# Patient Record
Sex: MALE | Race: WHITE | NOT HISPANIC OR LATINO | Employment: UNEMPLOYED | URBAN - METROPOLITAN AREA
[De-identification: names, ages, dates, MRNs, and addresses within clinical notes are randomized per-mention and may not be internally consistent; named-entity substitution may affect disease eponyms.]

---

## 2021-05-26 ENCOUNTER — RECORDS - HEALTHEAST (OUTPATIENT)
Dept: ADMINISTRATIVE | Facility: CLINIC | Age: 41
End: 2021-05-26

## 2022-09-09 ENCOUNTER — APPOINTMENT (OUTPATIENT)
Dept: CT IMAGING | Facility: HOSPITAL | Age: 42
End: 2022-09-09
Attending: EMERGENCY MEDICINE
Payer: COMMERCIAL

## 2022-09-09 ENCOUNTER — HOSPITAL ENCOUNTER (EMERGENCY)
Facility: HOSPITAL | Age: 42
Discharge: HOME | End: 2022-09-10
Attending: EMERGENCY MEDICINE | Admitting: EMERGENCY MEDICINE
Payer: COMMERCIAL

## 2022-09-09 DIAGNOSIS — K52.9 GASTROENTERITIS: ICD-10-CM

## 2022-09-09 LAB
ALBUMIN SERPL-MCNC: 3.8 G/DL (ref 3.5–5)
ALP SERPL-CCNC: 75 U/L (ref 45–120)
ALT SERPL W P-5'-P-CCNC: 11 U/L (ref 0–45)
ANION GAP SERPL CALCULATED.3IONS-SCNC: 10 MMOL/L (ref 5–18)
AST SERPL W P-5'-P-CCNC: 14 U/L (ref 0–40)
BASOPHILS # BLD AUTO: 0 10E3/UL (ref 0–0.2)
BASOPHILS NFR BLD AUTO: 0 %
BILIRUB SERPL-MCNC: 0.5 MG/DL (ref 0–1)
BUN SERPL-MCNC: 16 MG/DL (ref 8–22)
CALCIUM SERPL-MCNC: 9.4 MG/DL (ref 8.5–10.5)
CHLORIDE BLD-SCNC: 99 MMOL/L (ref 98–107)
CO2 SERPL-SCNC: 29 MMOL/L (ref 22–31)
CREAT SERPL-MCNC: 1.04 MG/DL (ref 0.7–1.3)
EOSINOPHIL # BLD AUTO: 0.2 10E3/UL (ref 0–0.7)
EOSINOPHIL NFR BLD AUTO: 2 %
ERYTHROCYTE [DISTWIDTH] IN BLOOD BY AUTOMATED COUNT: 13.1 % (ref 10–15)
GFR SERPL CREATININE-BSD FRML MDRD: >90 ML/MIN/1.73M2
GLUCOSE BLD-MCNC: 95 MG/DL (ref 70–125)
HCT VFR BLD AUTO: 51.9 % (ref 40–53)
HGB BLD-MCNC: 17.3 G/DL (ref 13.3–17.7)
IMM GRANULOCYTES # BLD: 0 10E3/UL
IMM GRANULOCYTES NFR BLD: 0 %
LIPASE SERPL-CCNC: 32 U/L (ref 0–52)
LYMPHOCYTES # BLD AUTO: 1.9 10E3/UL (ref 0.8–5.3)
LYMPHOCYTES NFR BLD AUTO: 25 %
MAGNESIUM SERPL-MCNC: 1.7 MG/DL (ref 1.8–2.6)
MCH RBC QN AUTO: 29.4 PG (ref 26.5–33)
MCHC RBC AUTO-ENTMCNC: 33.3 G/DL (ref 31.5–36.5)
MCV RBC AUTO: 88 FL (ref 78–100)
MONOCYTES # BLD AUTO: 1.6 10E3/UL (ref 0–1.3)
MONOCYTES NFR BLD AUTO: 20 %
NEUTROPHILS # BLD AUTO: 4.2 10E3/UL (ref 1.6–8.3)
NEUTROPHILS NFR BLD AUTO: 53 %
NRBC # BLD AUTO: 0 10E3/UL
NRBC BLD AUTO-RTO: 0 /100
PLATELET # BLD AUTO: 308 10E3/UL (ref 150–450)
POTASSIUM BLD-SCNC: 4.7 MMOL/L (ref 3.5–5)
PROT SERPL-MCNC: 6.9 G/DL (ref 6–8)
RBC # BLD AUTO: 5.89 10E6/UL (ref 4.4–5.9)
SODIUM SERPL-SCNC: 138 MMOL/L (ref 136–145)
WBC # BLD AUTO: 7.8 10E3/UL (ref 4–11)

## 2022-09-09 PROCEDURE — 96374 THER/PROPH/DIAG INJ IV PUSH: CPT | Mod: 59

## 2022-09-09 PROCEDURE — 250N000011 HC RX IP 250 OP 636: Performed by: EMERGENCY MEDICINE

## 2022-09-09 PROCEDURE — 36415 COLL VENOUS BLD VENIPUNCTURE: CPT | Performed by: EMERGENCY MEDICINE

## 2022-09-09 PROCEDURE — 258N000003 HC RX IP 258 OP 636: Performed by: EMERGENCY MEDICINE

## 2022-09-09 PROCEDURE — 80053 COMPREHEN METABOLIC PANEL: CPT | Performed by: EMERGENCY MEDICINE

## 2022-09-09 PROCEDURE — 96361 HYDRATE IV INFUSION ADD-ON: CPT

## 2022-09-09 PROCEDURE — 83690 ASSAY OF LIPASE: CPT | Performed by: EMERGENCY MEDICINE

## 2022-09-09 PROCEDURE — 85014 HEMATOCRIT: CPT | Performed by: EMERGENCY MEDICINE

## 2022-09-09 PROCEDURE — 99285 EMERGENCY DEPT VISIT HI MDM: CPT | Mod: 25

## 2022-09-09 PROCEDURE — 74177 CT ABD & PELVIS W/CONTRAST: CPT

## 2022-09-09 PROCEDURE — 83735 ASSAY OF MAGNESIUM: CPT | Performed by: EMERGENCY MEDICINE

## 2022-09-09 RX ORDER — KETOROLAC TROMETHAMINE 15 MG/ML
15 INJECTION, SOLUTION INTRAMUSCULAR; INTRAVENOUS ONCE
Status: COMPLETED | OUTPATIENT
Start: 2022-09-09 | End: 2022-09-10

## 2022-09-09 RX ORDER — IOPAMIDOL 755 MG/ML
100 INJECTION, SOLUTION INTRAVASCULAR ONCE
Status: COMPLETED | OUTPATIENT
Start: 2022-09-10 | End: 2022-09-09

## 2022-09-09 RX ORDER — SODIUM CHLORIDE 9 MG/ML
INJECTION, SOLUTION INTRAVENOUS CONTINUOUS
Status: DISCONTINUED | OUTPATIENT
Start: 2022-09-09 | End: 2022-09-10

## 2022-09-09 RX ORDER — ONDANSETRON 2 MG/ML
4 INJECTION INTRAMUSCULAR; INTRAVENOUS ONCE
Status: COMPLETED | OUTPATIENT
Start: 2022-09-09 | End: 2022-09-09

## 2022-09-09 RX ADMIN — SODIUM CHLORIDE 1000 ML: 9 INJECTION, SOLUTION INTRAVENOUS at 22:36

## 2022-09-09 RX ADMIN — ONDANSETRON 4 MG: 2 INJECTION INTRAMUSCULAR; INTRAVENOUS at 22:34

## 2022-09-09 RX ADMIN — SODIUM CHLORIDE 1000 ML: 9 INJECTION, SOLUTION INTRAVENOUS at 23:18

## 2022-09-09 RX ADMIN — IOPAMIDOL 100 ML: 755 INJECTION, SOLUTION INTRAVENOUS at 23:47

## 2022-09-09 ASSESSMENT — ENCOUNTER SYMPTOMS
DIARRHEA: 1
VOMITING: 1
HEMATURIA: 0
BLOOD IN STOOL: 0
DYSURIA: 0
NAUSEA: 1
ABDOMINAL PAIN: 1

## 2022-09-10 VITALS
OXYGEN SATURATION: 98 % | BODY MASS INDEX: 22.49 KG/M2 | HEIGHT: 65 IN | HEART RATE: 84 BPM | SYSTOLIC BLOOD PRESSURE: 122 MMHG | RESPIRATION RATE: 16 BRPM | TEMPERATURE: 99.7 F | WEIGHT: 135 LBS | DIASTOLIC BLOOD PRESSURE: 73 MMHG

## 2022-09-10 PROCEDURE — 250N000011 HC RX IP 250 OP 636: Performed by: EMERGENCY MEDICINE

## 2022-09-10 PROCEDURE — 96375 TX/PRO/DX INJ NEW DRUG ADDON: CPT

## 2022-09-10 RX ORDER — ONDANSETRON 4 MG/1
4 TABLET, ORALLY DISINTEGRATING ORAL EVERY 8 HOURS PRN
Qty: 20 TABLET | Refills: 0 | Status: SHIPPED | OUTPATIENT
Start: 2022-09-10 | End: 2022-09-13

## 2022-09-10 RX ADMIN — KETOROLAC TROMETHAMINE 15 MG: 15 INJECTION, SOLUTION INTRAMUSCULAR; INTRAVENOUS at 00:11

## 2022-09-10 RX ADMIN — FAMOTIDINE 20 MG: 10 INJECTION INTRAVENOUS at 00:11

## 2022-09-10 ASSESSMENT — ACTIVITIES OF DAILY LIVING (ADL): ADLS_ACUITY_SCORE: 35

## 2022-09-10 NOTE — ED PROVIDER NOTES
EMERGENCY DEPARTMENT ENCOUNTER      NAME: Ankit Man  AGE: 42 year old male  YOB: 1980  MRN: [id number]  EVALUATION DATE & TIME: No admission date for patient encounter.    PCP: System, Provider Not In    ED PROVIDER: Rebecca Bardales M.D.      Chief Complaint   Patient presents with     Abdominal Pain     Nausea, Vomiting, & Diarrhea         FINAL IMPRESSION:  1. Gastroenteritis        MEDICAL DECISION MAKIN:02 PM I met with the patient, obtained history, performed an initial exam, and discussed options and plan for diagnostics and treatment here in the ED. PPE worn: cloth mask, n95 mask, eye protection and nitrile gloves.  12:18 AM Results were communicated with the patient. Discussed discharge plans along with return precautions. Patient was agreeable with plan.        Pertinent Labs & Imaging studies reviewed. (See chart for details)    Ankit Man is a 42 year old male who presents with nausea, vomiting or abdominal pain.  He has a history of prior peptic ulcer disease, methamphetamine and marijuana use presenting for evaluation of abdominal pain, vomiting, and diarrhea. He was noted to be tachycardic at 115 initially.  Pain was in the left lower quadrant predominantly--gastroenteritis versus colitis versus enteritis.  No evidence by exam or history of GI bleeding.  He does regularly use methamphetamines.  Last marijuana use was a week ago.  Less likely to be hyperemesis syndrome from marijuana and more likely to be methamphetamine withdrawal related.  His abdominal exam is benign.  He has not had CT scans of late, so I will repeat this to evaluate for diverticulitis or other colitis.  Labs will be obtained.  He will get medications for pain, nausea and fluids.     Labs showed no acute abnormalities (CBC, Basic Metabolic Panel, liver chemistry tests, lipase all normal). CT abdomen pelvis showed no abnormalities.. He was treated with Zofran, Toradol, famotidine and IV fluids. Heart  "rate did improve with the interventions to the 70s. On reassessment, he felt improved.  He was given an oral challenge which he tolerated.. We will plan discharge with zofran, follow up with primary care doctor and GI. I advised methamphetamine treatment and provided phone numbers to find convenient treatment programs to help.  We discussed warning signs and indications to return to the emergency department.  He understands these and all discharge instructions.         MEDICATIONS GIVEN IN THE EMERGENCY:  Medications   0.9% sodium chloride BOLUS (0 mLs Intravenous Stopped 9/9/22 2318)   ondansetron (ZOFRAN) injection 4 mg (4 mg Intravenous Given 9/9/22 2234)   0.9% sodium chloride BOLUS (1,000 mLs Intravenous New Bag 9/9/22 2318)   ketorolac (TORADOL) injection 15 mg (15 mg Intravenous Given 9/10/22 0011)   iopamidol (ISOVUE-370) solution 100 mL (100 mLs Intravenous Given 9/9/22 2347)   famotidine (PEPCID) injection 20 mg (20 mg Intravenous Given 9/10/22 0011)       NEW PRESCRIPTIONS STARTED AT TODAY'S ER VISIT:  New Prescriptions    ONDANSETRON (ZOFRAN ODT) 4 MG ODT TAB    Take 1 tablet (4 mg) by mouth every 8 hours as needed for nausea or vomiting          =================================================================    HPI    Patient information was obtained from: patient     Use of : Use of : N/A       Ankit Man is a 42 year old male with a history of methamphetamine dependence, and regular marijuana use, who presents with left lower abdominal pain, nausea, vomiting, and diarrhea.    Patient here with 3 days of nausea and vomiting with associated left lower quadrant abdominal pain. Pain feels like a \"crampy\" sensation. He has had these symptoms before but usually they resolve on its own after a few episodes of vomiting but this has not stopped which prompt him to come into ED. No blood noted in vomit. He had been able to pass gas and has been having non-bloody diarrhea. He has been " "feeling \"warm\" but no actual measured fevers at home. He has not had any prior abdominal surgeries but has history of stomach ulcers. No other sick contacts at home. He does take omeprazole regularly. No other prescribed medications. He does smoke marijuana regularly but has never had associated nausea or vomiting with use. No hematuria or dysuria. No other medical complaints at this time.    REVIEW OF SYSTEMS   Review of Systems   Gastrointestinal: Positive for abdominal pain (LLQ \"crampy\"), diarrhea, nausea and vomiting (nonbloody). Negative for blood in stool.   Genitourinary: Negative.  Negative for dysuria and hematuria.   All other systems reviewed and are negative.       PAST MEDICAL HISTORY:  Past Medical History:   Diagnosis Date     Anxiety      Depressive disorder      ED (erectile dysfunction)      Substance abuse (H)        PAST SURGICAL HISTORY:  History reviewed. No pertinent surgical history.    CURRENT MEDICATIONS:      Current Facility-Administered Medications:      0.9% sodium chloride BOLUS, 1,000 mL, Intravenous, Once, Last Rate: 1,000 mL/hr at 09/09/22 2318, 1,000 mL at 09/09/22 2318 **FOLLOWED BY** sodium chloride 0.9% infusion, , Intravenous, Continuous, David Venegas MD    Current Outpatient Medications:      ondansetron (ZOFRAN ODT) 4 MG ODT tab, Take 1 tablet (4 mg) by mouth every 8 hours as needed for nausea or vomiting, Disp: 20 tablet, Rfl: 0    ALLERGIES:  No Known Allergies    FAMILY HISTORY:  History reviewed. No pertinent family history.    SOCIAL HISTORY:   Social History     Tobacco Use     Smoking status: Current Every Day Smoker     Types: Cigarettes     Smokeless tobacco: Never Used   Substance Use Topics     Alcohol use: Yes     Drug use: Yes     Types: Marijuana, Methamphetamines        PHYSICAL EXAM:    Vitals: /85   Pulse 82   Temp 99.7  F (37.6  C) (Temporal)   Resp 16   Ht 1.651 m (5' 5\")   Wt 61.2 kg (135 lb)   SpO2 98%   BMI 22.47 kg/m   "   General:. Alert and interactive, appears uncomfortable.  HENT: Oropharynx without erythema or exudates. MMM.  TMs clear bilaterally.  Eyes: Pupils mid-sized and equally reactive.   Neck: Full AROM.  No midline tenderness to palpation.  Cardiovascular: Regular rate and rhythm. Peripheral pulses 2+ bilaterally.  Chest/Pulmonary: Normal work of breathing. Lung sounds clear and equal throughout, no wheezes or crackles. No chest wall tenderness or deformities.  Abdomen: Soft, nondistended. LLQ tenderness without guarding or rebound. Normal bowel sounds.  Back/Spine: No CVA or midline tenderness.  Extremities: Normal ROM of all major joints. No lower extremity edema.   Skin: Warm and dry. Normal skin color.   Neuro: Speech clear. CNs grossly intact. Moves all extremities appropriately. Strength and sensation grossly intact to all extremities.   Psych: Normal affect/mood, cooperative, memory appropriate.     LAB:  All pertinent labs reviewed and interpreted.  Labs Ordered and Resulted from Time of ED Arrival to Time of ED Departure   MAGNESIUM - Abnormal       Result Value    Magnesium 1.7 (*)    CBC WITH PLATELETS AND DIFFERENTIAL - Abnormal    WBC Count 7.8      RBC Count 5.89      Hemoglobin 17.3      Hematocrit 51.9      MCV 88      MCH 29.4      MCHC 33.3      RDW 13.1      Platelet Count 308      % Neutrophils 53      % Lymphocytes 25      % Monocytes 20      % Eosinophils 2      % Basophils 0      % Immature Granulocytes 0      NRBCs per 100 WBC 0      Absolute Neutrophils 4.2      Absolute Lymphocytes 1.9      Absolute Monocytes 1.6 (*)     Absolute Eosinophils 0.2      Absolute Basophils 0.0      Absolute Immature Granulocytes 0.0      Absolute NRBCs 0.0     COMPREHENSIVE METABOLIC PANEL - Normal    Sodium 138      Potassium 4.7      Chloride 99      Carbon Dioxide (CO2) 29      Anion Gap 10      Urea Nitrogen 16      Creatinine 1.04      Calcium 9.4      Glucose 95      Alkaline Phosphatase 75      AST 14       ALT 11      Protein Total 6.9      Albumin 3.8      Bilirubin Total 0.5      GFR Estimate >90     LIPASE - Normal    Lipase 32         RADIOLOGY:  CT Abdomen Pelvis w Contrast   Final Result   IMPRESSION:    1.  No focal inflammatory change to explain patient's left lower quadrant pain. Specifically, no bowel wall thickening or diverticulitis.          I, Anabel Ortag, am serving as a scribe to document services personally performed by Dr. Rebecca Bardales  based on my observation and the provider's statements to me. I, Rebecca Bardales MD attest that Anabel Rodriguez is acting in a scribe capacity, has observed my performance of the services and has documented them in accordance with my direction.      Rebecca Bardales M.D.  Emergency Medicine  [facility] EMERGENCY DEPARTMENT  [facility] [facility] [address]  [phone]  Dept: [phone]         Rebecca Bardales MD  09/10/22 0104

## 2022-09-10 NOTE — DISCHARGE INSTRUCTIONS
Please follow-up with your primary clinic in a week if you are not beter to discuss referral to GI (gastroenterology).    Can also talk to your primary care doctor regarding methamphetamine treatment or you may call [phone] to find a treatment program near you. You can also visit the website [email or web address]    Until you are able to tolerate a clear liquid diet, do not advance beyond clears until you have vomiting controlled.  After that time you may follow a gentle BRAT diet consisting of bananas, plain rice, applesauce, toast or similar foods that are easily digested.    If you are unable to tolerate oral hydration despite following the above methods and using Zofran and your acid blocking medications, please return to the emergency department.

## 2022-09-10 NOTE — ED TRIAGE NOTES
Pt arrives c/o nausea, vomiting, and abdominal pain starting Wednesday. Pt states this has happened a couple times and the doctors have told him to take his prozac. Pt also reports diarrhea.      Triage Assessment     Row Name 09/09/22 1934       Triage Assessment (Adult)    Airway WDL WDL       Respiratory WDL    Respiratory WDL WDL       Cardiac WDL    Cardiac WDL WDL       Peripheral/Neurovascular WDL    Peripheral Neurovascular WDL WDL       Cognitive/Neuro/Behavioral WDL    Cognitive/Neuro/Behavioral WDL WDL

## 2023-05-08 ENCOUNTER — APPOINTMENT (OUTPATIENT)
Dept: RADIOLOGY | Facility: HOSPITAL | Age: 43
End: 2023-05-08
Attending: STUDENT IN AN ORGANIZED HEALTH CARE EDUCATION/TRAINING PROGRAM
Payer: COMMERCIAL

## 2023-05-08 ENCOUNTER — HOSPITAL ENCOUNTER (OUTPATIENT)
Facility: HOSPITAL | Age: 43
Setting detail: OBSERVATION
Discharge: HOME | End: 2023-05-09
Attending: STUDENT IN AN ORGANIZED HEALTH CARE EDUCATION/TRAINING PROGRAM | Admitting: STUDENT IN AN ORGANIZED HEALTH CARE EDUCATION/TRAINING PROGRAM
Payer: COMMERCIAL

## 2023-05-08 DIAGNOSIS — L03.114 CELLULITIS OF LEFT UPPER EXTREMITY: ICD-10-CM

## 2023-05-08 LAB
ALBUMIN SERPL BCG-MCNC: 4.1 G/DL (ref 3.5–5.2)
ALP SERPL-CCNC: 76 U/L (ref 40–129)
ALT SERPL W P-5'-P-CCNC: 23 U/L (ref 10–50)
ANION GAP SERPL CALCULATED.3IONS-SCNC: 8 MMOL/L (ref 7–15)
AST SERPL W P-5'-P-CCNC: 21 U/L (ref 10–50)
BASOPHILS # BLD AUTO: 0.1 10E3/UL (ref 0–0.2)
BASOPHILS NFR BLD AUTO: 1 %
BILIRUB SERPL-MCNC: <0.2 MG/DL
BUN SERPL-MCNC: 15.4 MG/DL (ref 6–20)
CALCIUM SERPL-MCNC: 9.8 MG/DL (ref 8.6–10)
CHLORIDE SERPL-SCNC: 101 MMOL/L (ref 98–107)
CREAT SERPL-MCNC: 0.9 MG/DL (ref 0.67–1.17)
CRP SERPL-MCNC: 7.4 MG/L
DEPRECATED HCO3 PLAS-SCNC: 31 MMOL/L (ref 22–29)
EOSINOPHIL # BLD AUTO: 0.2 10E3/UL (ref 0–0.7)
EOSINOPHIL NFR BLD AUTO: 2 %
ERYTHROCYTE [DISTWIDTH] IN BLOOD BY AUTOMATED COUNT: 13 % (ref 10–15)
ERYTHROCYTE [SEDIMENTATION RATE] IN BLOOD BY WESTERGREN METHOD: 11 MM/HR (ref 0–15)
GFR SERPL CREATININE-BSD FRML MDRD: >90 ML/MIN/1.73M2
GLUCOSE SERPL-MCNC: 98 MG/DL (ref 70–99)
HCT VFR BLD AUTO: 44.6 % (ref 40–53)
HGB BLD-MCNC: 14.9 G/DL (ref 13.3–17.7)
HOLD SPECIMEN: NORMAL
IMM GRANULOCYTES # BLD: 0 10E3/UL
IMM GRANULOCYTES NFR BLD: 1 %
LACTATE SERPL-SCNC: 0.9 MMOL/L (ref 0.7–2)
LYMPHOCYTES # BLD AUTO: 1.8 10E3/UL (ref 0.8–5.3)
LYMPHOCYTES NFR BLD AUTO: 21 %
MCH RBC QN AUTO: 29.2 PG (ref 26.5–33)
MCHC RBC AUTO-ENTMCNC: 33.4 G/DL (ref 31.5–36.5)
MCV RBC AUTO: 87 FL (ref 78–100)
MONOCYTES # BLD AUTO: 1.2 10E3/UL (ref 0–1.3)
MONOCYTES NFR BLD AUTO: 14 %
NEUTROPHILS # BLD AUTO: 5.3 10E3/UL (ref 1.6–8.3)
NEUTROPHILS NFR BLD AUTO: 61 %
NRBC # BLD AUTO: 0 10E3/UL
NRBC BLD AUTO-RTO: 0 /100
PLATELET # BLD AUTO: 296 10E3/UL (ref 150–450)
POTASSIUM SERPL-SCNC: 4.2 MMOL/L (ref 3.4–5.3)
PROT SERPL-MCNC: 6.9 G/DL (ref 6.4–8.3)
RBC # BLD AUTO: 5.11 10E6/UL (ref 4.4–5.9)
SODIUM SERPL-SCNC: 140 MMOL/L (ref 136–145)
WBC # BLD AUTO: 8.6 10E3/UL (ref 4–11)

## 2023-05-08 PROCEDURE — 80053 COMPREHEN METABOLIC PANEL: CPT | Performed by: STUDENT IN AN ORGANIZED HEALTH CARE EDUCATION/TRAINING PROGRAM

## 2023-05-08 PROCEDURE — 250N000011 HC RX IP 250 OP 636: Performed by: STUDENT IN AN ORGANIZED HEALTH CARE EDUCATION/TRAINING PROGRAM

## 2023-05-08 PROCEDURE — 86140 C-REACTIVE PROTEIN: CPT | Performed by: STUDENT IN AN ORGANIZED HEALTH CARE EDUCATION/TRAINING PROGRAM

## 2023-05-08 PROCEDURE — 85652 RBC SED RATE AUTOMATED: CPT | Performed by: STUDENT IN AN ORGANIZED HEALTH CARE EDUCATION/TRAINING PROGRAM

## 2023-05-08 PROCEDURE — 87040 BLOOD CULTURE FOR BACTERIA: CPT | Performed by: STUDENT IN AN ORGANIZED HEALTH CARE EDUCATION/TRAINING PROGRAM

## 2023-05-08 PROCEDURE — 258N000003 HC RX IP 258 OP 636: Performed by: STUDENT IN AN ORGANIZED HEALTH CARE EDUCATION/TRAINING PROGRAM

## 2023-05-08 PROCEDURE — 73130 X-RAY EXAM OF HAND: CPT | Mod: RT

## 2023-05-08 PROCEDURE — 96365 THER/PROPH/DIAG IV INF INIT: CPT

## 2023-05-08 PROCEDURE — 83605 ASSAY OF LACTIC ACID: CPT | Performed by: STUDENT IN AN ORGANIZED HEALTH CARE EDUCATION/TRAINING PROGRAM

## 2023-05-08 PROCEDURE — 85025 COMPLETE CBC W/AUTO DIFF WBC: CPT | Performed by: STUDENT IN AN ORGANIZED HEALTH CARE EDUCATION/TRAINING PROGRAM

## 2023-05-08 PROCEDURE — 99285 EMERGENCY DEPT VISIT HI MDM: CPT | Mod: 25

## 2023-05-08 PROCEDURE — 96366 THER/PROPH/DIAG IV INF ADDON: CPT

## 2023-05-08 PROCEDURE — 36415 COLL VENOUS BLD VENIPUNCTURE: CPT | Performed by: STUDENT IN AN ORGANIZED HEALTH CARE EDUCATION/TRAINING PROGRAM

## 2023-05-08 RX ORDER — SILDENAFIL 100 MG/1
100 TABLET, FILM COATED ORAL DAILY PRN
COMMUNITY
Start: 2023-03-30

## 2023-05-08 RX ORDER — PANTOPRAZOLE SODIUM 40 MG/1
40 TABLET, DELAYED RELEASE ORAL
Status: DISCONTINUED | OUTPATIENT
Start: 2023-05-09 | End: 2023-05-09 | Stop reason: HOSPADM

## 2023-05-08 RX ORDER — CEFAZOLIN SODIUM 1 G/50ML
1250 SOLUTION INTRAVENOUS ONCE
Status: COMPLETED | OUTPATIENT
Start: 2023-05-08 | End: 2023-05-08

## 2023-05-08 RX ADMIN — VANCOMYCIN HYDROCHLORIDE 1250 MG: 5 INJECTION, POWDER, LYOPHILIZED, FOR SOLUTION INTRAVENOUS at 20:54

## 2023-05-08 ASSESSMENT — ACTIVITIES OF DAILY LIVING (ADL)
ADLS_ACUITY_SCORE: 33
ADLS_ACUITY_SCORE: 35

## 2023-05-08 ASSESSMENT — ENCOUNTER SYMPTOMS
FEVER: 1
COLOR CHANGE: 1
MYALGIAS: 1

## 2023-05-08 NOTE — ED TRIAGE NOTES
Right 3rd finger swelling, wound on knuckle, redness and swelling into hand starting Friday. Denies pain.     Triage Assessment     Row Name 05/08/23 6844       Triage Assessment (Adult)    Airway WDL WDL       Respiratory WDL    Respiratory WDL WDL       Skin Circulation/Temperature WDL    Skin Circulation/Temperature WDL WDL       Cardiac WDL    Cardiac WDL WDL       Peripheral/Neurovascular WDL    Peripheral Neurovascular WDL WDL       Cognitive/Neuro/Behavioral WDL    Cognitive/Neuro/Behavioral WDL WDL

## 2023-05-09 VITALS
HEIGHT: 65 IN | RESPIRATION RATE: 16 BRPM | HEART RATE: 99 BPM | SYSTOLIC BLOOD PRESSURE: 123 MMHG | TEMPERATURE: 99.3 F | BODY MASS INDEX: 23.66 KG/M2 | DIASTOLIC BLOOD PRESSURE: 69 MMHG | OXYGEN SATURATION: 96 % | WEIGHT: 142 LBS

## 2023-05-09 PROBLEM — L03.114 CELLULITIS OF LEFT UPPER EXTREMITY: Status: ACTIVE | Noted: 2023-05-09

## 2023-05-09 LAB
ANION GAP SERPL CALCULATED.3IONS-SCNC: 9 MMOL/L (ref 7–15)
BASOPHILS # BLD MANUAL: 0 10E3/UL (ref 0–0.2)
BASOPHILS NFR BLD MANUAL: 0 %
BUN SERPL-MCNC: 17 MG/DL (ref 6–20)
CALCIUM SERPL-MCNC: 9 MG/DL (ref 8.6–10)
CHLORIDE SERPL-SCNC: 105 MMOL/L (ref 98–107)
CREAT SERPL-MCNC: 0.94 MG/DL (ref 0.67–1.17)
DEPRECATED HCO3 PLAS-SCNC: 29 MMOL/L (ref 22–29)
EOSINOPHIL # BLD MANUAL: 0.5 10E3/UL (ref 0–0.7)
EOSINOPHIL NFR BLD MANUAL: 7 %
ERYTHROCYTE [DISTWIDTH] IN BLOOD BY AUTOMATED COUNT: 13.1 % (ref 10–15)
GFR SERPL CREATININE-BSD FRML MDRD: >90 ML/MIN/1.73M2
GLUCOSE SERPL-MCNC: 103 MG/DL (ref 70–99)
HCT VFR BLD AUTO: 42.3 % (ref 40–53)
HGB BLD-MCNC: 13.7 G/DL (ref 13.3–17.7)
LYMPHOCYTES # BLD MANUAL: 2.4 10E3/UL (ref 0.8–5.3)
LYMPHOCYTES NFR BLD MANUAL: 37 %
MCH RBC QN AUTO: 28.6 PG (ref 26.5–33)
MCHC RBC AUTO-ENTMCNC: 32.4 G/DL (ref 31.5–36.5)
MCV RBC AUTO: 88 FL (ref 78–100)
MONOCYTES # BLD MANUAL: 0.9 10E3/UL (ref 0–1.3)
MONOCYTES NFR BLD MANUAL: 14 %
NEUTROPHILS # BLD MANUAL: 2.8 10E3/UL (ref 1.6–8.3)
NEUTROPHILS NFR BLD MANUAL: 42 %
PLAT MORPH BLD: NORMAL
PLATELET # BLD AUTO: 276 10E3/UL (ref 150–450)
POTASSIUM SERPL-SCNC: 4.4 MMOL/L (ref 3.4–5.3)
RBC # BLD AUTO: 4.79 10E6/UL (ref 4.4–5.9)
RBC MORPH BLD: NORMAL
SODIUM SERPL-SCNC: 143 MMOL/L (ref 136–145)
WBC # BLD AUTO: 6.6 10E3/UL (ref 4–11)

## 2023-05-09 PROCEDURE — 258N000003 HC RX IP 258 OP 636: Performed by: INTERNAL MEDICINE

## 2023-05-09 PROCEDURE — 85027 COMPLETE CBC AUTOMATED: CPT | Performed by: INTERNAL MEDICINE

## 2023-05-09 PROCEDURE — 250N000011 HC RX IP 250 OP 636: Performed by: INTERNAL MEDICINE

## 2023-05-09 PROCEDURE — 99207 PR APP CREDIT; MD BILLING SHARED VISIT: CPT | Performed by: HOSPITALIST

## 2023-05-09 PROCEDURE — 82310 ASSAY OF CALCIUM: CPT | Performed by: INTERNAL MEDICINE

## 2023-05-09 PROCEDURE — G0378 HOSPITAL OBSERVATION PER HR: HCPCS

## 2023-05-09 PROCEDURE — 96361 HYDRATE IV INFUSION ADD-ON: CPT

## 2023-05-09 PROCEDURE — 36415 COLL VENOUS BLD VENIPUNCTURE: CPT | Performed by: INTERNAL MEDICINE

## 2023-05-09 PROCEDURE — 250N000013 HC RX MED GY IP 250 OP 250 PS 637: Performed by: INTERNAL MEDICINE

## 2023-05-09 PROCEDURE — 96366 THER/PROPH/DIAG IV INF ADDON: CPT

## 2023-05-09 PROCEDURE — 85007 BL SMEAR W/DIFF WBC COUNT: CPT | Performed by: INTERNAL MEDICINE

## 2023-05-09 PROCEDURE — 99223 1ST HOSP IP/OBS HIGH 75: CPT | Mod: AI | Performed by: INTERNAL MEDICINE

## 2023-05-09 RX ORDER — SODIUM CHLORIDE 9 MG/ML
INJECTION, SOLUTION INTRAVENOUS CONTINUOUS
Status: DISCONTINUED | OUTPATIENT
Start: 2023-05-09 | End: 2023-05-09 | Stop reason: HOSPADM

## 2023-05-09 RX ORDER — ONDANSETRON 4 MG/1
4 TABLET, ORALLY DISINTEGRATING ORAL EVERY 6 HOURS PRN
Status: DISCONTINUED | OUTPATIENT
Start: 2023-05-09 | End: 2023-05-09 | Stop reason: HOSPADM

## 2023-05-09 RX ORDER — VANCOMYCIN HYDROCHLORIDE 1 G/200ML
1000 INJECTION, SOLUTION INTRAVENOUS EVERY 12 HOURS
Status: DISCONTINUED | OUTPATIENT
Start: 2023-05-09 | End: 2023-05-09 | Stop reason: HOSPADM

## 2023-05-09 RX ORDER — OXYCODONE HYDROCHLORIDE 5 MG/1
5 TABLET ORAL EVERY 4 HOURS PRN
Status: DISCONTINUED | OUTPATIENT
Start: 2023-05-09 | End: 2023-05-09 | Stop reason: HOSPADM

## 2023-05-09 RX ORDER — CEPHALEXIN 500 MG/1
500 CAPSULE ORAL 3 TIMES DAILY
Qty: 30 CAPSULE | Refills: 0 | Status: SHIPPED | OUTPATIENT
Start: 2023-05-09 | End: 2023-05-19

## 2023-05-09 RX ORDER — SULFAMETHOXAZOLE/TRIMETHOPRIM 800-160 MG
1 TABLET ORAL 2 TIMES DAILY
Qty: 20 TABLET | Refills: 0 | Status: SHIPPED | OUTPATIENT
Start: 2023-05-09 | End: 2023-05-19

## 2023-05-09 RX ORDER — ONDANSETRON 4 MG/1
4 TABLET, ORALLY DISINTEGRATING ORAL EVERY 6 HOURS PRN
Qty: 20 TABLET | Refills: 0 | Status: SHIPPED | OUTPATIENT
Start: 2023-05-09

## 2023-05-09 RX ORDER — MORPHINE SULFATE 4 MG/ML
4 INJECTION, SOLUTION INTRAMUSCULAR; INTRAVENOUS EVERY 4 HOURS PRN
Status: DISCONTINUED | OUTPATIENT
Start: 2023-05-09 | End: 2023-05-09 | Stop reason: HOSPADM

## 2023-05-09 RX ORDER — IBUPROFEN 600 MG/1
600 TABLET, FILM COATED ORAL EVERY 6 HOURS PRN
Status: DISCONTINUED | OUTPATIENT
Start: 2023-05-09 | End: 2023-05-09 | Stop reason: HOSPADM

## 2023-05-09 RX ORDER — ACETAMINOPHEN 500 MG
500 TABLET ORAL EVERY 6 HOURS PRN
Qty: 20 TABLET | Refills: 1 | Status: SHIPPED | OUTPATIENT
Start: 2023-05-09

## 2023-05-09 RX ORDER — ONDANSETRON 2 MG/ML
4 INJECTION INTRAMUSCULAR; INTRAVENOUS EVERY 6 HOURS PRN
Status: DISCONTINUED | OUTPATIENT
Start: 2023-05-09 | End: 2023-05-09 | Stop reason: HOSPADM

## 2023-05-09 RX ORDER — OXYCODONE HYDROCHLORIDE 5 MG/1
5 TABLET ORAL EVERY 4 HOURS PRN
Qty: 10 TABLET | Refills: 0 | Status: SHIPPED | OUTPATIENT
Start: 2023-05-09

## 2023-05-09 RX ORDER — IBUPROFEN 600 MG/1
600 TABLET, FILM COATED ORAL EVERY 6 HOURS PRN
Qty: 20 TABLET | Refills: 0 | Status: SHIPPED | OUTPATIENT
Start: 2023-05-09

## 2023-05-09 RX ADMIN — SODIUM CHLORIDE: 9 INJECTION, SOLUTION INTRAVENOUS at 02:09

## 2023-05-09 RX ADMIN — OXYCODONE HYDROCHLORIDE 5 MG: 5 TABLET ORAL at 02:08

## 2023-05-09 RX ADMIN — VANCOMYCIN HYDROCHLORIDE 1000 MG: 1 INJECTION, SOLUTION INTRAVENOUS at 08:44

## 2023-05-09 RX ADMIN — PANTOPRAZOLE SODIUM 40 MG: 40 TABLET, DELAYED RELEASE ORAL at 08:44

## 2023-05-09 RX ADMIN — Medication 1 MG: at 02:08

## 2023-05-09 RX ADMIN — SODIUM CHLORIDE: 9 INJECTION, SOLUTION INTRAVENOUS at 11:59

## 2023-05-09 ASSESSMENT — ACTIVITIES OF DAILY LIVING (ADL)
ADLS_ACUITY_SCORE: 35

## 2023-05-09 NOTE — PHARMACY-VANCOMYCIN DOSING SERVICE
Pharmacy Vancomycin Initial Note  Date of Service May 8, 2023  Patient's  1980  42 year old, male    Indication: Skin and Soft Tissue Infection    Current estimated CrCl = CrCl cannot be calculated (Patient's most recent lab result is older than the maximum 30 days allowed.).    Wt Readings from Last 3 Encounters:   22 61.2 kg (135 lb)     Creatinine for last 3 days  No results found for requested labs within last 3 days.    Recent Vancomycin Level(s) for last 3 days  No results found for requested labs within last 3 days.      Vancomycin IV Administrations (past 72 hours)      No vancomycin orders with administrations in past 72 hours.                Nephrotoxins and other renal medications (From now, onward)    None          Contrast Orders - past 72 hours (72h ago, onward)    None            Plan:  1. Start vancomycin  1250 mg IV once. (No weight charted & patient remains in waiting room, therefore no nurse assigned. Using available weight in Epic for loading dose, will need more precise measurement prior to maintenance dosing.)  2. Please re-consult pharmacy if therapy to continue upon admission.    Ofelia Matthews, [facility]

## 2023-05-09 NOTE — ED NOTES
"[facility] ED Handoff Report    ED Chief Complaint: R middle finger infection    ED Diagnosis:  (L03.114) Cellulitis of left upper extremity  Comment:  Plan:       PMH:    Past Medical History:   Diagnosis Date    Anxiety     Depressive disorder     ED (erectile dysfunction)     Substance abuse (H)         Code Status:  Full Code     Falls Risk: No Band: Not applicable    Current Living Situation/Residence: N/A    Elimination Status: Continent: Yes     Activity Level: Independent    Patients Preferred Language:  English     Needed: No    Vital Signs:  /69   Pulse 70   Temp 99.3  F (37.4  C)   Resp 16   Ht 1.651 m (5' 5\")   Wt 64.4 kg (142 lb)   SpO2 95%   BMI 23.63 kg/m       Cardiac Rhythm: Sinus    Pain Score: 0/10    Is the Patient Confused:  No    Last Food or Drink: 05/09/23 at 0900    Focused Assessment:  Aox4. Respiratory WDL. Cardiac WDL. R middle finger swollen and red. No pain or tenderness around site.     Tests Performed: Done: Labs and Imaging    Treatments Provided:  Abx therapy, pain relief    Family Dynamics/Concerns: No    Family Updated On Visitor Policy: Yes    Plan of Care Communicated to Family: Yes    Belongings Checklist Done and Signed by Patient: Yes    Medications sent with patient: N/A    Covid: asymptomatic , negative    Additional Information: N/A    RN: Eduardo Lopez RN  5/9/2023 9:24 AM       "

## 2023-05-09 NOTE — PHARMACY-ADMISSION MEDICATION HISTORY
Pharmacist Admission Medication History    Admission medication history is complete. The information provided in this note is only as accurate as the sources available at the time of the update.    Medication reconciliation/reorder completed by provider prior to medication history? No    Information Source(s): Patient and CareEverywhere/SureScripts via in-person with N95 mask    Pertinent Information: n/a    Changes made to PTA medication list:    Added: Omeprazole, sildenafil    Deleted: None    Changed: None    Medication Affordability:  Not including over the counter (OTC) medications, was there a time in the past 12 months when you did not take your medications as prescribed because of cost?: No    Allergies reviewed with patient and updates made in EHR: yes    Medication History Completed By: Marivel Briones, PharmD 5/8/2023 10:04 PM    Prior to Admission medications    Medication Sig Last Dose Taking? Auth Provider Long Term End Date   omeprazole (PRILOSEC) 20 MG DR capsule Take 20 mg by mouth daily as needed for heartburn Past Week at PRN Yes Unknown, Entered By History     sildenafil (VIAGRA) 100 MG tablet Take 100 mg by mouth daily as needed for erectile dysfunction TAKE 30MINS TO 4HRS BEFORE SEXUAL ACTIVITY. MAX 1TAB/DAY Past Week at PRN Yes Unknown, Entered By History Yes

## 2023-05-09 NOTE — DISCHARGE SUMMARY
[facility]  Hospitalist Discharge Summary      Date of Admission:  5/8/2023  Date of Discharge:  5/9/2023  Discharging Provider: Jass Stern MD  Discharge Service: Hospitalist Service    Discharge Diagnoses   Cellulitis    Follow-ups Needed After Discharge   Follow-up Appointments     Follow-up and recommended labs and tests       Follow up with primary care provider, Provider Not In System, IN 10 days   for hospital follow- up.  The following labs/tests are recommended: CBC,   BMP, CRP.             Unresulted Labs Ordered in the Past 30 Days of this Admission     Date and Time Order Name Status Description    5/8/2023  8:06 PM Blood Culture Line, venous In process     5/8/2023  8:06 PM Blood Culture Peripheral Blood In process       These results will be followed up by preliminary report of blood cultures has no growth. (Confirmed with microbiology)    Discharge Disposition   Discharged to home  Condition at discharge: Stable      Hospital Course   Patient is a 42-year-old male who was admitted to [facility] on 8 May 2023 with complaints of redness and blister formation over the neck.  Right hand associated with fever chills and body ache.  However he has been afebrile here and was started on IV vancomycin for presumed cellulitis.  Initial blood cultures are negative.  He appears to have a good range of movement over the hands without any evidence of tenosynovitis.  He did decide to go home and therefore was transitioned to oral antibiotics cephalexin and Bactrim as he is not having any fever appears to have a near normal CRP blood cultures are negative and does not appear septic or toxic.  However he is advised to return back in case develops any fever worsening redness or swelling over the right hand or purulent drainage.  The patient voices understanding    Consultations This Hospital Stay   PHARMACY TO DOSE VANCO  PHARMACY TO DOSE VANCO    Code Status   Full Code    Time  Spent on this Encounter   I, Jass Stern MD, personally saw the patient today and spent less than or equal to 30 minutes discharging this patient.       Jass Stern MD  [facility] EMERGENCY DEPARTMENT  [facility] [facility] [address]  Phone: [phone]  ______________________________________________________________________    Physical Exam   Vital Signs: Temp: 99.3  F (37.4  C)   BP: 123/69 Pulse: 99   Resp: 16 SpO2: 96 %      Weight: 142 lbs 0 oz  Constitutional: awake, alert, cooperative, no apparent distress, and appears stated age  Eyes: Lids and lashes normal, pupils equal, round and reactive to light, extra ocular muscles intact, sclera clear, conjunctiva normal  ENT: Normocephalic, without obvious abnormality, atraumatic, sinuses nontender on palpation, external ears without lesions, oral pharynx with moist mucous membranes, tonsils without erythema or exudates, gums normal and good dentition.  Hematologic / Lymphatic: no cervical lymphadenopathy  Respiratory: No increased work of breathing, good air exchange, clear to auscultation bilaterally, no crackles or wheezing  Cardiovascular: Normal apical impulse, regular rate and rhythm, normal S1 and S2, no S3 or S4, and no murmur noted  GI: No scars, normal bowel sounds, soft, non-distended, non-tender, no masses palpated, no hepatosplenomegally  Skin: no bruising or bleeding  Musculoskeletal: Redness over the knuckles of right hand.  However movements are intact no fluctuation  Neurologic: Awake, alert, oriented to name, place and time.  Cranial nerves II-XII are grossly intact.  Motor is 5 out of 5 bilaterally.  Cerebellar finger to nose, heel to shin intact.  Sensory is intact.  Babinski down going, Romberg negative, and gait is normal.       Primary Care Physician   Provider Not In System    Discharge Orders      Reason for your hospital stay    CELLULITIS     Follow-up and recommended labs and tests     Follow  up with primary care provider, Provider Not In System, IN 10 days for hospital follow- up.  The following labs/tests are recommended: CBC, BMP, CRP.     Activity    Your activity upon discharge: activity as tolerated     Diet    Follow this diet upon discharge: Regular       Significant Results and Procedures   Most Recent 3 CBC's:Recent Labs   Lab Test 05/09/23 0452 05/08/23 1944 09/09/22 2230   WBC 6.6 8.6 7.8   HGB 13.7 14.9 17.3   MCV 88 87 88    296 308     Most Recent 3 BMP's:Recent Labs   Lab Test 05/09/23 0452 05/08/23 1944 09/09/22  2230    140 138   POTASSIUM 4.4 4.2 4.7   CHLORIDE 105 101 99   CO2 29 31* 29   BUN 17.0 15.4 16   CR 0.94 0.90 1.04   ANIONGAP 9 8 10   JOSH 9.0 9.8 9.4   * 98 95     Most Recent 2 LFT's:Recent Labs   Lab Test 05/08/23 1944 09/09/22 2230   AST 21 14   ALT 23 11   ALKPHOS 76 75   BILITOTAL <0.2 0.5   ,   Results for orders placed or performed during the hospital encounter of 05/08/23   XR Hand Right G/E 3 Views    Narrative    EXAM: XR HAND RIGHT G/E 3 VIEWS  LOCATION: [facility]  DATE/TIME: 5/8/2023 7:49 PM CDT    INDICATION: infection middle finger, with pain.  COMPARISON: None.      Impression    IMPRESSION: Normal joint spaces and alignment. No fracture. No osteomyelitis. Mild soft tissue swelling around the PIP joint of the middle finger.       Discharge Medications   Current Discharge Medication List      START taking these medications    Details   acetaminophen (TYLENOL) 500 MG tablet Take 1 tablet (500 mg) by mouth every 6 hours as needed for mild pain  Qty: 20 tablet, Refills: 1    Associated Diagnoses: Cellulitis of left upper extremity      cephALEXin (KEFLEX) 500 MG capsule Take 1 capsule (500 mg) by mouth 3 times daily for 10 days  Qty: 30 capsule, Refills: 0    Associated Diagnoses: Cellulitis of left upper extremity      ibuprofen (ADVIL/MOTRIN) 600 MG tablet Take 1 tablet (600 mg) by mouth every 6 hours as needed  for inflammatory pain, fever or mild pain  Qty: 20 tablet, Refills: 0    Associated Diagnoses: Cellulitis of left upper extremity      magnesium hydroxide (MILK OF MAGNESIA) 400 MG/5ML suspension Take 30 mLs by mouth daily as needed for constipation  Qty: 354 mL, Refills: 1    Associated Diagnoses: Cellulitis of left upper extremity      ondansetron (ZOFRAN ODT) 4 MG ODT tab Take 1 tablet (4 mg) by mouth every 6 hours as needed for nausea or vomiting  Qty: 20 tablet, Refills: 0    Associated Diagnoses: Cellulitis of left upper extremity      oxyCODONE (ROXICODONE) 5 MG tablet Take 1 tablet (5 mg) by mouth every 4 hours as needed for moderate pain (IF pain not managed with non-pharmacological and non-opioid interventions)  Qty: 10 tablet, Refills: 0    Associated Diagnoses: Cellulitis of left upper extremity      sulfamethoxazole-trimethoprim (BACTRIM DS) 800-160 MG tablet Take 1 tablet by mouth 2 times daily for 10 days  Qty: 20 tablet, Refills: 0    Associated Diagnoses: Cellulitis of left upper extremity         CONTINUE these medications which have NOT CHANGED    Details   omeprazole (PRILOSEC) 20 MG DR capsule Take 20 mg by mouth daily as needed for heartburn      sildenafil (VIAGRA) 100 MG tablet Take 100 mg by mouth daily as needed for erectile dysfunction TAKE 30MINS TO 4HRS BEFORE SEXUAL ACTIVITY. MAX 1TAB/DAY           Allergies   No Known Allergies

## 2023-05-09 NOTE — ED PROVIDER NOTES
EMERGENCY DEPARTMENT ENCOUNTER      NAME: Ankit Man  AGE: 42 year old male  YOB: 1980  MRN: [id number]  EVALUATION DATE & TIME: No admission date for patient encounter.    PCP: System, Provider Not In    ED PROVIDER: Logan Moser M.D.      Chief Complaint   Patient presents with     Wound Infection         FINAL IMPRESSION:  1. Cellulitis of left upper extremity          ED COURSE & MEDICAL DECISION MAKING:    Pertinent Labs & Imaging studies reviewed. (See chart for details)  42 year old male presents to the Emergency Department for evaluation of pain and redness in the left hand.  Patient appeared to have a cellulitis of that hand and history was consistent.  Was concern initially for the possibility of septic arthritis however there is no pain with movement of that joint.  Does not appear to be any superficial abscess.  History also does not seem consistent with flexor tenosynovitis does not have Knievel signs are present.  I felt however based on the fact that it is his dominant hand as well as the appearance of the significant streaking up his arm that admission to the hospital for observation and IV antibiotics would be warranted.  Patient's blood work here is normal.  No signs of trauma.  No foreign body as x-ray was normal.         7:14 PM I met with the patient, obtained history, performed an initial exam, and discussed options and plan for diagnostics and treatment here in the ED.  11:36 PM I rechecked and updated the patient.   11:51 PM I discussed the case with hospitalist, Dr Antony, who accepts the patient.    At the conclusion of the encounter I discussed the results of all of the tests and the disposition. The questions were answered. The patient or family acknowledged understanding and was agreeable with the care plan.       Medical Decision Making    History:    Supplemental history from: Documented in chart, if applicable    External Record(s) reviewed: Documented in chart,  "if applicable.    Work Up:    Chart documentation includes differential considered and any EKGs or imaging independently interpreted by provider, where specified.    In additional to work up documented, I considered the following work up: Documented in chart, if applicable.    External consultation:    Discussion of management with another provider: Hospitalist    Complicating factors:    Care impacted by chronic illness: Mental Health    Care affected by social determinants of health: Alcohol Abuse and/or Recreational Drug Use    Disposition considerations: Admit.           minutes of critical care time     MEDICATIONS GIVEN IN THE EMERGENCY:  Medications   pantoprazole (PROTONIX) EC tablet 40 mg (has no administration in time range)   vancomycin (VANCOCIN) 1,250 mg in sodium chloride 0.9 % 250 mL intermittent infusion (0 mg Intravenous Stopped 5/8/23 5274)       NEW PRESCRIPTIONS STARTED AT TODAY'S ER VISIT  New Prescriptions    No medications on file          =================================================================    HPI    Patient information was obtained from: Patient     Use of : None        Ankit Man is a 42 year old male with a pertinent history of substance abuse, depressive disorder, and anxiety, who presents for evaluation of swelling.    On Friday (~3 days ago), the patient endorses \"red spots\" and swelling to the 3rd digit of his right hand. The patient states that he was able to drain some of the swelling on Saturday (~2 days ago) and noticed clear fluid. After he drained his wound, the patient notes that he started to develop increased redness and swelling. The patient states that his swelling and redness radiated to his hands and arm, up into his armpit. He describes his swelling as a \"tightness\" but denies any pain. He endorses body aches, with aching greatest to his right arm. He also reports of some fevers. He is right handed. The patient otherwise denies any other " symptoms or complaints at this time.     Social Hx: Patient denies IV drug use.     REVIEW OF SYSTEMS   Review of Systems   Constitutional: Positive for fever.   Musculoskeletal: Positive for myalgias.        Positive for swelling.   Skin: Positive for color change.   All other systems reviewed and are negative.       PAST MEDICAL HISTORY:  Past Medical History:   Diagnosis Date     Anxiety      Depressive disorder      ED (erectile dysfunction)      Substance abuse (H)        PAST SURGICAL HISTORY:  History reviewed. No pertinent surgical history.        CURRENT MEDICATIONS:    omeprazole (PRILOSEC) 20 MG DR capsule  sildenafil (VIAGRA) 100 MG tablet        ALLERGIES:  No Known Allergies    FAMILY HISTORY:  History reviewed. No pertinent family history.    SOCIAL HISTORY:   Social History     Socioeconomic History     Marital status: Single   Tobacco Use     Smoking status: Every Day     Types: Cigarettes     Smokeless tobacco: Never   Substance and Sexual Activity     Alcohol use: Yes     Drug use: Yes     Types: Marijuana, Methamphetamines     Sexual activity: Yes       VITALS:  /84   Pulse 82   Temp 99.3  F (37.4  C)   Resp 18   Wt 64.5 kg (142 lb 4.8 oz)   SpO2 99%   BMI 23.68 kg/m        PHYSICAL EXAM    VITAL SIGNS: /84   Pulse 82   Temp 99.3  F (37.4  C)   Resp 18   Wt 64.5 kg (142 lb 4.8 oz)   SpO2 99%   BMI 23.68 kg/m    Constitutional:  Well developed, well nourished   EYES: Conjunctivae clear, no discharge  HENT: Atraumatic, normocephalic, bilateral external ears normal.  Oropharynx moist. Nose normal.   Neck: Normal ROM , Supple   Respiratory:  No respiratory distress, normal nonlabored respirations.   Cardiovascular:  Distal perfusion appears intact  Musculoskeletal:  No cyanosis, No clubbing. Good range of motion in all major joints.   Integument:  Warm, Dry, No rash. PIP joint swollen and erythematous. Lesion to medial portion of knuckle. No drainage. Redness to dorsum aspect  of hand. Streaks of redness to arm. No axillary adenopathy.   Neurologic:  Alert and oriented. No focal deficits noted.  Ambulatory  Psychiatric:  Affect normal            LAB:  All pertinent labs reviewed and interpreted.  Labs Ordered and Resulted from Time of ED Arrival to Time of ED Departure   COMPREHENSIVE METABOLIC PANEL - Abnormal       Result Value    Sodium 140      Potassium 4.2      Chloride 101      Carbon Dioxide (CO2) 31 (*)     Anion Gap 8      Urea Nitrogen 15.4      Creatinine 0.90      Calcium 9.8      Glucose 98      Alkaline Phosphatase 76      AST 21      ALT 23      Protein Total 6.9      Albumin 4.1      Bilirubin Total <0.2      GFR Estimate >90     CRP INFLAMMATION - Abnormal    CRP Inflammation 7.40 (*)    LACTIC ACID WHOLE BLOOD - Normal    Lactic Acid 0.9     ERYTHROCYTE SEDIMENTATION RATE AUTO - Normal    Erythrocyte Sedimentation Rate 11     CBC WITH PLATELETS AND DIFFERENTIAL    WBC Count 8.6      RBC Count 5.11      Hemoglobin 14.9      Hematocrit 44.6      MCV 87      MCH 29.2      MCHC 33.4      RDW 13.0      Platelet Count 296      % Neutrophils 61      % Lymphocytes 21      % Monocytes 14      % Eosinophils 2      % Basophils 1      % Immature Granulocytes 1      NRBCs per 100 WBC 0      Absolute Neutrophils 5.3      Absolute Lymphocytes 1.8      Absolute Monocytes 1.2      Absolute Eosinophils 0.2      Absolute Basophils 0.1      Absolute Immature Granulocytes 0.0      Absolute NRBCs 0.0     BLOOD CULTURE   BLOOD CULTURE       RADIOLOGY:  Reviewed all pertinent imaging. Please see official radiology report.  XR Hand Right G/E 3 Views   Final Result   IMPRESSION: Normal joint spaces and alignment. No fracture. No osteomyelitis. Mild soft tissue swelling around the PIP joint of the middle finger.            Parker LYNCH Cha, am serving as a scribe to document services personally performed by Dr. Logan Moser based on my observation and the provider's statements to me. Logan LYNCH  MD Ehsan attest that Parker Rodriguez is acting in a scribe capacity, has observed my performance of the services and has documented them in accordance with my direction.    Logan Moser M.D.  Emergency Medicine  [facility] EMERGENCY DEPARTMENT  [address] [place] [id number]  [phone]  Dept: [phone]     Logan Moser MD  05/09/23 0007

## 2023-05-09 NOTE — ED NOTES
Addended by: TYLER RENTERIA on: 9/2/2022 06:35 PM     Modules accepted: Orders     Pt is a/o x4, girlfriend at bedside, pt denies pain, swelling/redness noted to right hand, CMS intact, VSS.

## 2023-05-09 NOTE — H&P
[facility]    History and Physical - Hospitalist Service       Date of Admission:  5/8/2023    Assessment & Plan      Ankit Man is a right-handed 42 year old male, a , with medical history of GERD and ED, admitted on 5/8/2023 with progressive swelling and pain of the right hand originating from a popped non-traumatic blister noted on the third finger 3 days ago, with associated fever, chills and body aches.  ED work-up showed elevated CRP, unremarkable  chemistry showing mild metabolic alkalosis, and unremarkable CBC.  X-ray of the right hand shows normal joint spaces and alignment, no fracture and no osteomyelitis.    #Right hand cellulitis: We will treat aggressively considering that this is the dominant hand:  - Blood cultures.  - Vancomycin to include coverage for MRSA.  - Local heat and limb elevation..  - NSAIDs.  - Pain management.  - Consider MRI of the affected limb and hand surgeon consult if not improving on conservative management.     Diet: Regular Diet Adult    DVT Prophylaxis: Ambulate every shift  Villagran Catheter: Not present  Lines: None     Cardiac Monitoring: None  Code Status: Full Code      Clinically Significant Risk Factors Present on Admission                               Disposition Plan Home     Expected Discharge Date: 05/10/2023                  Makayla Antony MD  Hospitalist Service  [facility]  Securely message with [employer] (more info)  Text page via [employer] Paging/Directory     ______________________________________________________________________    Chief Complaint   Right hand pain x 3 days    History is obtained from the patient    History of Present Illness   Ankit Man is a 42 year old male who presented to the ED  with progressive swelling and pain of the right hand originating from a popped non-traumatic blister noted on the third finger 3 days ago, with associated fever, chills and body aches.  No chest  pain, cough, shortness of breath, nausea, vomiting, change in bowel habits, urinary symptoms, numbness or tingling.      Past Medical History    Past Medical History:   Diagnosis Date     Anxiety      Depressive disorder      ED (erectile dysfunction)      Substance abuse (H)        Past Surgical History   History reviewed. No pertinent surgical history.    Prior to Admission Medications   Prior to Admission Medications   Prescriptions Last Dose Informant Patient Reported? Taking?   omeprazole (PRILOSEC) 20 MG DR capsule Past Week at PRN  Yes Yes   Sig: Take 20 mg by mouth daily as needed for heartburn   sildenafil (VIAGRA) 100 MG tablet Past Week at PRN  Yes Yes   Sig: Take 100 mg by mouth daily as needed for erectile dysfunction TAKE 30MINS TO 4HRS BEFORE SEXUAL ACTIVITY. MAX 1TAB/DAY      Facility-Administered Medications: None        Review of Systems    The 10 point Review of Systems is negative other than noted in the HPI.      Physical Exam   Vital Signs: Temp: 99.3  F (37.4  C)   BP: 128/79 Pulse: 86   Resp: 18 SpO2: 99 %      Weight: 142 lbs 4.8 oz    General Appearance: Well nourished and well developed, in no acute distress.  Respiratory: Clear to auscultation with good air entry bilaterally.  Cardiovascular: Regular rate and rhythm.  S1 and S2 normal.  No murmurs, gallops or rubs.  GI: Soft, nontender, nondistended.  Bowel sounds present.  No organomegaly.  Skin: Erythema and warmth of the right third finger and right hand; a nondraining blister over the right hand third PIP joint.  Other: Alert, oriented x3.  No focal signs.  Full range of motion x4 including third PIP joint of the right hand    Medical Decision Making     75 MINUTES SPENT BY ME on the date of service doing chart review, history, exam, documentation & further activities per the note.      Data     I have personally reviewed the following data over the past 24 hrs:    8.6  \   14.9   / 296     140 101 15.4 /  98   4.2 31 (H) 0.90 \        ALT: 23 AST: 21 AP: 76 TBILI: <0.2   ALB: 4.1 TOT PROTEIN: 6.9 LIPASE: N/A       Procal: N/A CRP: 7.40 (H) Lactic Acid: 0.9         Imaging results reviewed over the past 24 hrs:   Recent Results (from the past 24 hour(s))   XR Hand Right G/E 3 Views    Narrative    EXAM: XR HAND RIGHT G/E 3 VIEWS  LOCATION: [facility]  DATE/TIME: 5/8/2023 7:49 PM CDT    INDICATION: infection middle finger, with pain.  COMPARISON: None.      Impression    IMPRESSION: Normal joint spaces and alignment. No fracture. No osteomyelitis. Mild soft tissue swelling around the PIP joint of the middle finger.

## 2023-05-09 NOTE — PHARMACY-VANCOMYCIN DOSING SERVICE
Pharmacy Vancomycin Initial Note  Date of Service May 9, 2023  Patient's  1980  42 year old, male  Indication: Skin and Soft Tissue Infection  Current estimated CrCl = Estimated Creatinine Clearance: 98.5 mL/min (based on SCr of 0.9 mg/dL).  Creatinine for last 3 days  2023:  7:44 PM Creatinine 0.90 mg/dL    Vancomycin IV Administrations (past 72 hours)                   vancomycin (VANCOCIN) 1,250 mg in sodium chloride 0.9 % 250 mL intermittent infusion (mg) 1,250 mg New Bag 23            [employer] Prediction of Planned Initial Vancomycin Regimen  Regimen: 1000 mg IV every 12 hours.  Start time: 08:54 on 2023  Exposure target: AUC24 (range)400-600 mg/L.hr   AUC24,ss: 517 mg/L.hr  Probability of AUC24 > 400: 76 %  Ctrough,ss: 15.6 mg/L  Probability of Ctrough,ss > 20: 30 %  Probability of nephrotoxicity (Lodise LUIS ALBERTO ): 11 %      Plan:  1. Start vancomycin  1000 mg IV q12h.   2. Vancomycin monitoring method: AUC  3. Vancomycin therapeutic monitoring goal: 400-600 mg*h/L  4. Pharmacy will check vancomycin levels as appropriate in 1-3 Days.    5. Serum creatinine levels will be ordered daily for the first week of therapy and at least twice weekly for subsequent weeks.      Karen Medina, [facility]

## 2023-05-14 LAB
BACTERIA BLD CULT: NO GROWTH
BACTERIA BLD CULT: NO GROWTH

## 2023-11-02 ENCOUNTER — HOSPITAL ENCOUNTER (OUTPATIENT)
Dept: GENERAL RADIOLOGY | Facility: HOSPITAL | Age: 43
Discharge: HOME | End: 2023-11-02
Attending: STUDENT IN AN ORGANIZED HEALTH CARE EDUCATION/TRAINING PROGRAM | Admitting: STUDENT IN AN ORGANIZED HEALTH CARE EDUCATION/TRAINING PROGRAM
Payer: COMMERCIAL

## 2023-11-02 ENCOUNTER — OFFICE VISIT (OUTPATIENT)
Dept: FAMILY MEDICINE | Facility: CLINIC | Age: 43
End: 2023-11-02
Payer: COMMERCIAL

## 2023-11-02 VITALS
RESPIRATION RATE: 18 BRPM | DIASTOLIC BLOOD PRESSURE: 90 MMHG | HEART RATE: 85 BPM | SYSTOLIC BLOOD PRESSURE: 157 MMHG | TEMPERATURE: 98.8 F | OXYGEN SATURATION: 98 %

## 2023-11-02 DIAGNOSIS — S99.912A ANKLE INJURY, LEFT, INITIAL ENCOUNTER: Primary | ICD-10-CM

## 2023-11-02 DIAGNOSIS — S99.912A ANKLE INJURY, LEFT, INITIAL ENCOUNTER: ICD-10-CM

## 2023-11-02 PROCEDURE — 73610 X-RAY EXAM OF ANKLE: CPT | Mod: LT

## 2023-11-02 PROCEDURE — 99203 OFFICE O/P NEW LOW 30 MIN: CPT | Performed by: STUDENT IN AN ORGANIZED HEALTH CARE EDUCATION/TRAINING PROGRAM

## 2023-11-02 NOTE — PATIENT INSTRUCTIONS
Sprains/Strains  For musculoskeletal injuries including: sprains, strains, bruises, use the acronym P.R.I.C.E. for symptomatic treatment:  P - prevent further injury.  R - rest affected area for 48-72 hours.  I - ice applied 20 minutes on/40 minutes off throughout the day, especially for first 48 hours. Do not apply ice directly to skin - wrap ice in thin towel or pillow case.   C - compression of injured area (ACE wrap, splint).  E - elevated affected area.    Stop exacerbating activity for 2-6 weeks. Restrict activities that may aggravate symptoms and avoid heavy lifting. Focus on positions that maximize comfort. Gradually return to exercise as tolerated.   Recommend light stretching with mild range of motion exercises to prevent stiffness, increase strength, and increase flexibility. Doing these exercises multiple times daily can reduce your risk of of developing frozen joint.  Recovery is expected in 2-6 weeks depending on severity, but may take up to 12 months.    If you have significant pain, swelling, and tenderness, follow-up in the clinic for repeat examination in 1 week.     Take acetaminophen and/or ibuprofen as needed for pain  Acetaminophen (tylenol)  325-650 mg every 4-6 hours as needed OR 1000 mg every 6 hours as needed. Maximum dose: 4000 mg/day  Ibuprofen (advil, motrin)  200-400 mg every 4-6 hours as needed -800 mg every 6-8 hours as needed. Maximum dose: 3200 mg/day

## 2023-11-02 NOTE — PROGRESS NOTES
ASSESSMENT & PLAN:   Diagnoses and all orders for this visit:  Ankle injury, left, initial encounter  -     XR Ankle Left G/E 3 Views; Future  -     Ankle/Foot Bracing Supplies DME Ankle Brace; Left  -     Crutches Order for DME - ONLY FOR DME    Left ankle dorsiflexion injury that occurred earlier today. X-ray negative. Suspect soft tissue injury. RICE, tylenol/ibuprofen. Patient given ankle brace, crutches to be used as needed.     At the end of the encounter, I discussed results, diagnosis, medications. Discussed red flags for immediate return to clinic/ER, as well as indications for follow up if no improvement. Patient and/or caregiver understood and agreed to plan. Patient was stable for discharge.    Patient Instructions   Sprains/Strains  For musculoskeletal injuries including: sprains, strains, bruises, use the acronym P.R.I.C.E. for symptomatic treatment:  P - prevent further injury.  R - rest affected area for 48-72 hours.  I - ice applied 20 minutes on/40 minutes off throughout the day, especially for first 48 hours. Do not apply ice directly to skin - wrap ice in thin towel or pillow case.   C - compression of injured area (ACE wrap, splint).  E - elevated affected area.    Stop exacerbating activity for 2-6 weeks. Restrict activities that may aggravate symptoms and avoid heavy lifting. Focus on positions that maximize comfort. Gradually return to exercise as tolerated.   Recommend light stretching with mild range of motion exercises to prevent stiffness, increase strength, and increase flexibility. Doing these exercises multiple times daily can reduce your risk of of developing frozen joint.  Recovery is expected in 2-6 weeks depending on severity, but may take up to 12 months.    If you have significant pain, swelling, and tenderness, follow-up in the clinic for repeat examination in 1 week.     Take acetaminophen and/or ibuprofen as needed for pain  Acetaminophen (tylenol)  325-650 mg every 4-6 hours  as needed OR 1000 mg every 6 hours as needed. Maximum dose: 4000 mg/day  Ibuprofen (advil, motrin)  200-400 mg every 4-6 hours as needed -800 mg every 6-8 hours as needed. Maximum dose: 3200 mg/day     ------------------------------------------------------------------------  SUBJECTIVE  History was obtained from patient.    Patient presents with:  Ankle Pain: Rolled ankle 3 hrs ago, Lt ankle, sharp pain when applying pressure on foot, some numbness pass ankle, has been icing ankle, swelling and bruising    HPI  Ankit Man is a(n) 43 year old male presenting to urgent care for the ankle injury that occurred earlier today. He jumped off a retaining wall approximately 5 feet high and landed on a curb, resulting in a dorsiflexion injury. Has pain in the anterior lateral ankle. Pain worse with weightbearing.    Review of Systems    Current Outpatient Medications   Medication Sig Dispense Refill    acetaminophen (TYLENOL) 500 MG tablet Take 1 tablet (500 mg) by mouth every 6 hours as needed for mild pain 20 tablet 1    ibuprofen (ADVIL/MOTRIN) 600 MG tablet Take 1 tablet (600 mg) by mouth every 6 hours as needed for inflammatory pain, fever or mild pain 20 tablet 0    omeprazole (PRILOSEC) 20 MG DR capsule Take 20 mg by mouth daily as needed for heartburn      ondansetron (ZOFRAN ODT) 4 MG ODT tab Take 1 tablet (4 mg) by mouth every 6 hours as needed for nausea or vomiting 20 tablet 0    oxyCODONE (ROXICODONE) 5 MG tablet Take 1 tablet (5 mg) by mouth every 4 hours as needed for moderate pain (IF pain not managed with non-pharmacological and non-opioid interventions) 10 tablet 0    sildenafil (VIAGRA) 100 MG tablet Take 100 mg by mouth daily as needed for erectile dysfunction TAKE 30MINS TO 4HRS BEFORE SEXUAL ACTIVITY. MAX 1TAB/DAY      magnesium hydroxide (MILK OF MAGNESIA) 400 MG/5ML suspension Take 30 mLs by mouth daily as needed for constipation (Patient not taking: Reported on 11/2/2023) 354 mL 1      Problem List:  2023-05: Cellulitis of left upper extremity    No Known Allergies      OBJECTIVE  Vitals:    11/02/23 1648   BP: (!) 157/90   Pulse: 85   Resp: 18   Temp: 98.8  F (37.1  C)   TempSrc: Oral   SpO2: 98%     Physical Exam   GENERAL: healthy, alert, no acute distress.   PSYCH: mentation appears normal. Normal affect  MSK: left LE -no deformity, ecchymosis, edema, erythema. Minimal tenderness to palpation of lateral malleolus and anterolateral ankle. No other tenderness in foot, ankle, calf. Full ROM dorsiflexion and plantarflexion with minimal pain. Achilles tendon intact and nontender. Distal sensation intact. Capillary refill less than 2 seconds    Xrays were preliminarily reviewed by me - negative.     Results for orders placed or performed during the hospital encounter of 11/02/23   XR Ankle Left G/E 3 Views     Status: None    Narrative    EXAM: XR ANKLE LEFT G/E 3 VIEWS  LOCATION: [facility]  DATE: 11/2/2023    INDICATION: dorsiflexion injury, pain lateral  COMPARISON: None.      Impression    IMPRESSION: The left ankle is negative for fracture. No soft tissue swelling.

## 2025-05-01 ENCOUNTER — APPOINTMENT (OUTPATIENT)
Dept: CT IMAGING | Facility: HOSPITAL | Age: 45
End: 2025-05-01
Payer: COMMERCIAL

## 2025-05-01 ENCOUNTER — HOSPITAL ENCOUNTER (EMERGENCY)
Facility: HOSPITAL | Age: 45
Discharge: HOME | End: 2025-05-01
Payer: COMMERCIAL

## 2025-05-01 VITALS
OXYGEN SATURATION: 97 % | HEART RATE: 81 BPM | BODY MASS INDEX: 21.83 KG/M2 | SYSTOLIC BLOOD PRESSURE: 127 MMHG | TEMPERATURE: 97.3 F | RESPIRATION RATE: 20 BRPM | WEIGHT: 131 LBS | HEIGHT: 65 IN | DIASTOLIC BLOOD PRESSURE: 74 MMHG

## 2025-05-01 DIAGNOSIS — S05.02XA CONJUNCTIVAL ABRASION, LEFT, INITIAL ENCOUNTER: ICD-10-CM

## 2025-05-01 PROCEDURE — 250N000009 HC RX 250

## 2025-05-01 PROCEDURE — 99284 EMERGENCY DEPT VISIT MOD MDM: CPT | Mod: 25

## 2025-05-01 PROCEDURE — 70480 CT ORBIT/EAR/FOSSA W/O DYE: CPT

## 2025-05-01 RX ORDER — ERYTHROMYCIN 5 MG/G
0.5 OINTMENT OPHTHALMIC 4 TIMES DAILY
Qty: 3.5 G | Refills: 0 | Status: SHIPPED | OUTPATIENT
Start: 2025-05-01 | End: 2025-05-06

## 2025-05-01 RX ORDER — TETRACAINE HYDROCHLORIDE 5 MG/ML
1-2 SOLUTION OPHTHALMIC ONCE
Status: COMPLETED | OUTPATIENT
Start: 2025-05-01 | End: 2025-05-01

## 2025-05-01 RX ADMIN — FLUORESCEIN SODIUM 1 STRIP: 1 STRIP OPHTHALMIC at 10:53

## 2025-05-01 RX ADMIN — TETRACAINE HYDROCHLORIDE 2 DROP: 5 SOLUTION OPHTHALMIC at 10:53

## 2025-05-01 ASSESSMENT — VISUAL ACUITY
OS: 20/50
OU: 20/50
OD: 20/50
OS: 20/50
OD: 20/50
OU: NORMAL

## 2025-05-01 ASSESSMENT — COLUMBIA-SUICIDE SEVERITY RATING SCALE - C-SSRS
1. IN THE PAST MONTH, HAVE YOU WISHED YOU WERE DEAD OR WISHED YOU COULD GO TO SLEEP AND NOT WAKE UP?: NO
6. HAVE YOU EVER DONE ANYTHING, STARTED TO DO ANYTHING, OR PREPARED TO DO ANYTHING TO END YOUR LIFE?: NO
2. HAVE YOU ACTUALLY HAD ANY THOUGHTS OF KILLING YOURSELF IN THE PAST MONTH?: NO

## 2025-05-01 ASSESSMENT — ACTIVITIES OF DAILY LIVING (ADL): ADLS_ACUITY_SCORE: 41

## 2025-05-01 NOTE — DISCHARGE INSTRUCTIONS
You have a scratch on your eye.  Thankfully your CT scan did not show any metal shrapnel that was left in your eye.  Please use the antibiotic ointment 4 times a day for the next 5 days to help prevent infection.  Use Tylenol and ibuprofen as needed for discomfort.    You may take 600 mg of ibuprofen (Advil) every 6 hours and 650 mg acetaminophen (Tylenol) every 6 hours for pain. Do not take more than 3200 mg of ibuprofen (Advil) in 24 hours. Do not take more than 3000 mg of acetaminophen (Tylenol) in 24 hours. You may take this much for 1-3 days, then only use as needed.     Please follow-up with the eye doctor.    Return to the emergency department for vision loss, fever, eye swelling, or any other concerning symptoms.

## 2025-05-01 NOTE — ED PROVIDER NOTES
Emergency Department Encounter   NAME: Ankit Man  AGE: 44 year old male  YOB: 1980  MRN: [id number]    PCP: Clinic, [facility]  ED PROVIDER: Anya Barahona PA-C    Evaluation Date & Time:   No admission date for patient encounter.    CHIEF COMPLAINT:  Foreign Body in Eye      Impression and Plan   MDM: 44-year-old male with no pertinent history presents for evaluation of left eye foreign body.  Grinding metal yesterday morning when a piece of metal shrapnel hit his left eye.  Rinsed out his eye.  Feels that something is stuck in there.  No vision changes.  On arrival here patient is vitally stable.  Afebrile.  On my exam patient is in no acute distress.  Posterior equal reactive to light bilaterally.  EOM intact.  No conjunctival injection.  No eye discharge.  No foreign body visualized underneath the lids.  Woods lamp examination completed with evidence of small circular conjunctival abrasion without Alec sign noted be overtly concerning for globe rupture.  Pain resolved with tetracaine.  Visual acuity is equal bilaterally.  Not consistent with scleritis, iritis, glaucoma.  Patient feels that his pain was greatly improved after he flushed out his eye.  I suspect he likely had a superficial foreign body that he flushed out and now has a residual conjunctival abrasion.  Though given mechanism of high velocity metal while he was grinding we will obtain a CT of the orbits to assess for orbital foreign body.  Patient is agreeable with the plan.    CT of the orbits per my independent interpretation without any obvious radiopaque foreign body of the left orbit.  No foreign body visualized per radiology read, see full report below.    Tetanus is up-to-date.    I reassessed the patient.  We discussed imaging results.  Reviewed again Woods lamp examination findings.  Plan for erythromycin ointment and ophthalmology follow-up.  I provided him with [facility] contact information he will  call today.  Tylenol and ibuprofen as needed for discomfort.  We reviewed strict return precautions, signs of infection, patient was discharged home in stable condition.    ED Course as of 05/01/25 1126   Thu May 01, 2025   1015 Completed Woods lamp examination        Medical Decision Making  I obtained history from Family Member/Significant Other  I reviewed the EMR: [facility]: last tetanus 2023  Discharge. I prescribed additional prescription strength medication(s) as charted. N/A.    MIPS (CTPE, Dental pain, Villagran, Sinusitis, Asthma/COPD, Head Trauma): Not Applicable    SEPSIS: None          FINAL IMPRESSION:    ICD-10-CM    1. Conjunctival abrasion, left, initial encounter  S05.02XA             MEDICATIONS GIVEN IN THE EMERGENCY DEPARTMENT:  Medications   tetracaine (PONTOCAINE) 0.5 % ophthalmic solution 1-2 drop (2 drops Left Eye $Given 5/1/25 1053)   fluorescein (FUL-JOSE FRANCISCO) ophthalmic strip 1 strip (1 strip Left Eye $Given 5/1/25 1053)         NEW PRESCRIPTIONS STARTED AT TODAY'S ED VISIT:  New Prescriptions    ERYTHROMYCIN (ROMYCIN) 5 MG/GM OPHTHALMIC OINTMENT    Place 0.5 inches Into the left eye 4 times daily for 5 days.         HPI   Patient information was obtained from: patient and girlfriend    Use of Intrepreter: N/A     Ankit Man is a 44 year old male with no pertinent history who presents to the ED by car for evaluation of eye pain.  Yesterday morning patient was grinding metal and something flew and hit him in the left eye.  He rinsed out his eye.  He still feels like something is stuck.  Denies any vision changes.  Does not wear glasses or contacts.  Has not taken any thing for the pain.  Was not wearing any glasses or goggles.  Does not have an eye doctor.      REVIEW OF SYSTEMS:  Pertinent positive and negative symptoms per HPI.       Physical Exam     First Vitals:  Patient Vitals for the past 24 hrs:   BP Temp Temp src Pulse Resp SpO2 Height Weight   05/01/25 0921 128/77 97.3  F (36.3  C)  "Temporal 83 20 98 % 1.651 m (5' 5\") 59.4 kg (131 lb)       PHYSICAL EXAM:   General Appearance:  Alert, cooperative, no distress, appears stated age  HENT: Normocephalic without obvious deformity, atraumatic. Mucous membranes moist   Eyes: Conjunctiva clear, Lids normal. No discharge.  Left upper and lower lids everted without evidence of foreign body.  EOM intact.  Pupils equal reactive to light bilaterally.  Musculoskeletal: Moving all extremities. No gross deformities  Integument: Warm, dry, no rashes or lesions  Neurologic: Alert and orientated x3.   Psych: Normal mood and affect      Results     LAB:  All pertinent labs reviewed and interpreted  Labs Ordered and Resulted from Time of ED Arrival to Time of ED Departure - No data to display    RADIOLOGY:  CT Orbits wo Contrast   Preliminary Result   IMPRESSION: No metallic density foreign body identified in either orbit.               PROCEDURES:    PROCEDURE: Woods lamp Exam   INDICATIONS: Ocular trauma   PROCEDURE PROVIDER: Anya Barahona PA-C   SITE: left eye   CONSENT:  The risks, benefits and alternatives for this procedure were explained to the patient and verbally accepted.     MEDICATION: fluorescein stain and tetracaine   EXAM FINDINGS:   Left Eye: Pinpoint area of uptake of the medial superior conjunctive of consistent with previous foreign body.  No visualized foreign body.  No Alec sign.  No dendritic lesion.   COMPLICATIONS: Patient tolerated procedure well, without complication         Anya Barahona PA-C   Emergency Medicine   [facility] EMERGENCY DEPARTMENT       Anya Barahona PA-C  05/01/25 1126    "

## 2025-05-01 NOTE — ED TRIAGE NOTES
Patient was grinding metal last night and debris flung up into his left eye, washed it out last night with water. Today eye is still painful and feels it may be stuck in his eye. No vision changes.     Triage Assessment (Adult)       Row Name 05/01/25 0922          Triage Assessment    Airway WDL WDL        Respiratory WDL    Respiratory WDL WDL        Skin Circulation/Temperature WDL    Skin Circulation/Temperature WDL WDL        Cardiac WDL    Cardiac WDL WDL        Peripheral/Neurovascular WDL    Peripheral Neurovascular WDL WDL        Cognitive/Neuro/Behavioral WDL    Cognitive/Neuro/Behavioral WDL WDL

## 2025-05-01 NOTE — ED NOTES
Bed: JNFT17  Expected date:   Expected time:   Means of arrival:   Comments:  Shawn Man when clean

## 2025-07-08 PROCEDURE — 99285 EMERGENCY DEPT VISIT HI MDM: CPT | Mod: 25 | Performed by: STUDENT IN AN ORGANIZED HEALTH CARE EDUCATION/TRAINING PROGRAM

## 2025-07-08 PROCEDURE — 99285 EMERGENCY DEPT VISIT HI MDM: CPT | Mod: 25

## 2025-07-08 ASSESSMENT — COLUMBIA-SUICIDE SEVERITY RATING SCALE - C-SSRS
2. HAVE YOU ACTUALLY HAD ANY THOUGHTS OF KILLING YOURSELF IN THE PAST MONTH?: NO
1. IN THE PAST MONTH, HAVE YOU WISHED YOU WERE DEAD OR WISHED YOU COULD GO TO SLEEP AND NOT WAKE UP?: NO
6. HAVE YOU EVER DONE ANYTHING, STARTED TO DO ANYTHING, OR PREPARED TO DO ANYTHING TO END YOUR LIFE?: NO

## 2025-07-09 ENCOUNTER — HOSPITAL ENCOUNTER (EMERGENCY)
Facility: HOSPITAL | Age: 45
Discharge: HOME | End: 2025-07-09
Attending: STUDENT IN AN ORGANIZED HEALTH CARE EDUCATION/TRAINING PROGRAM | Admitting: STUDENT IN AN ORGANIZED HEALTH CARE EDUCATION/TRAINING PROGRAM
Payer: COMMERCIAL

## 2025-07-09 VITALS
SYSTOLIC BLOOD PRESSURE: 142 MMHG | DIASTOLIC BLOOD PRESSURE: 89 MMHG | HEIGHT: 65 IN | TEMPERATURE: 98.2 F | RESPIRATION RATE: 16 BRPM | BODY MASS INDEX: 25.91 KG/M2 | WEIGHT: 155.5 LBS | OXYGEN SATURATION: 97 % | HEART RATE: 76 BPM

## 2025-07-09 DIAGNOSIS — J02.9 SORE THROAT: ICD-10-CM

## 2025-07-09 LAB
ANION GAP SERPL CALCULATED.3IONS-SCNC: 10 MMOL/L (ref 7–15)
BASOPHILS # BLD AUTO: 0.1 10E3/UL (ref 0–0.2)
BASOPHILS NFR BLD AUTO: 1 %
BUN SERPL-MCNC: 16.3 MG/DL (ref 6–20)
CALCIUM SERPL-MCNC: 10 MG/DL (ref 8.8–10.4)
CHLORIDE SERPL-SCNC: 100 MMOL/L (ref 98–107)
CREAT SERPL-MCNC: 0.84 MG/DL (ref 0.67–1.17)
EGFRCR SERPLBLD CKD-EPI 2021: >90 ML/MIN/1.73M2
EOSINOPHIL # BLD AUTO: 0.3 10E3/UL (ref 0–0.7)
EOSINOPHIL NFR BLD AUTO: 3 %
ERYTHROCYTE [DISTWIDTH] IN BLOOD BY AUTOMATED COUNT: 13.4 % (ref 10–15)
GLUCOSE SERPL-MCNC: 91 MG/DL (ref 70–99)
HCO3 SERPL-SCNC: 30 MMOL/L (ref 22–29)
HCT VFR BLD AUTO: 46.3 % (ref 40–53)
HGB BLD-MCNC: 15.7 G/DL (ref 13.3–17.7)
IMM GRANULOCYTES # BLD: 0.1 10E3/UL
IMM GRANULOCYTES NFR BLD: 1 %
LYMPHOCYTES # BLD AUTO: 3.1 10E3/UL (ref 0.8–5.3)
LYMPHOCYTES NFR BLD AUTO: 29 %
MCH RBC QN AUTO: 29.1 PG (ref 26.5–33)
MCHC RBC AUTO-ENTMCNC: 33.9 G/DL (ref 31.5–36.5)
MCV RBC AUTO: 86 FL (ref 78–100)
MONOCYTES # BLD AUTO: 1.2 10E3/UL (ref 0–1.3)
MONOCYTES NFR BLD AUTO: 11 %
NEUTROPHILS # BLD AUTO: 5.9 10E3/UL (ref 1.6–8.3)
NEUTROPHILS NFR BLD AUTO: 55 %
NRBC # BLD AUTO: 0 10E3/UL
NRBC BLD AUTO-RTO: 0 /100
PLATELET # BLD AUTO: 337 10E3/UL (ref 150–450)
POTASSIUM SERPL-SCNC: 4.5 MMOL/L (ref 3.4–5.3)
RBC # BLD AUTO: 5.39 10E6/UL (ref 4.4–5.9)
SODIUM SERPL-SCNC: 140 MMOL/L (ref 135–145)
WBC # BLD AUTO: 10.6 10E3/UL (ref 4–11)

## 2025-07-09 PROCEDURE — 36415 COLL VENOUS BLD VENIPUNCTURE: CPT | Performed by: STUDENT IN AN ORGANIZED HEALTH CARE EDUCATION/TRAINING PROGRAM

## 2025-07-09 PROCEDURE — 85025 COMPLETE CBC W/AUTO DIFF WBC: CPT | Performed by: STUDENT IN AN ORGANIZED HEALTH CARE EDUCATION/TRAINING PROGRAM

## 2025-07-09 PROCEDURE — 250N000011 HC RX IP 250 OP 636: Performed by: STUDENT IN AN ORGANIZED HEALTH CARE EDUCATION/TRAINING PROGRAM

## 2025-07-09 PROCEDURE — 250N000009 HC RX 250: Performed by: STUDENT IN AN ORGANIZED HEALTH CARE EDUCATION/TRAINING PROGRAM

## 2025-07-09 PROCEDURE — 82310 ASSAY OF CALCIUM: CPT | Performed by: STUDENT IN AN ORGANIZED HEALTH CARE EDUCATION/TRAINING PROGRAM

## 2025-07-09 PROCEDURE — 96374 THER/PROPH/DIAG INJ IV PUSH: CPT | Mod: 59

## 2025-07-09 RX ORDER — LIDOCAINE HYDROCHLORIDE 20 MG/ML
15 SOLUTION OROPHARYNGEAL
Qty: 100 ML | Refills: 0 | Status: SHIPPED | OUTPATIENT
Start: 2025-07-09

## 2025-07-09 RX ORDER — KETOROLAC TROMETHAMINE 15 MG/ML
15 INJECTION, SOLUTION INTRAMUSCULAR; INTRAVENOUS ONCE
Status: COMPLETED | OUTPATIENT
Start: 2025-07-09 | End: 2025-07-09

## 2025-07-09 RX ORDER — LIDOCAINE HYDROCHLORIDE 20 MG/ML
10 SOLUTION OROPHARYNGEAL ONCE
Status: COMPLETED | OUTPATIENT
Start: 2025-07-09 | End: 2025-07-09

## 2025-07-09 RX ORDER — IOPAMIDOL 755 MG/ML
90 INJECTION, SOLUTION INTRAVASCULAR ONCE
Status: COMPLETED | OUTPATIENT
Start: 2025-07-09 | End: 2025-07-09

## 2025-07-09 RX ORDER — LIDOCAINE HYDROCHLORIDE 20 MG/ML
15 SOLUTION OROPHARYNGEAL
Qty: 100 ML | Refills: 0 | Status: SHIPPED | OUTPATIENT
Start: 2025-07-09 | End: 2025-07-09

## 2025-07-09 RX ADMIN — KETOROLAC TROMETHAMINE 15 MG: 15 INJECTION, SOLUTION INTRAMUSCULAR; INTRAVENOUS at 02:06

## 2025-07-09 RX ADMIN — IOPAMIDOL 90 ML: 755 INJECTION, SOLUTION INTRAVENOUS at 03:38

## 2025-07-09 RX ADMIN — LIDOCAINE HYDROCHLORIDE 10 ML: 20 SOLUTION ORAL at 01:56

## 2025-07-09 ASSESSMENT — ACTIVITIES OF DAILY LIVING (ADL)
ADLS_ACUITY_SCORE: 41

## 2025-07-09 NOTE — ED PROVIDER NOTES
EMERGENCY DEPARTMENT ENCOUNTER      NAME: Ankit Man  AGE: 44 year old male  YOB: 1980  MRN: [id number]  EVALUATION DATE & TIME: 7/9/2025  1:14 AM    PCP: Clinic, [facility]    ED PROVIDER: Drew Landers MD      Chief Complaint   Patient presents with    Swallowed Foreign Body         FINAL IMPRESSION:  1. Sore throat          ED COURSE & MEDICAL DECISION MAKING:    Pertinent Labs & Imaging studies reviewed. (See chart for details)  44 year old male presents to the Emergency Department for evaluation of swallowed foreign body, R throat pain    ED Course as of 07/09/25 0422   Wed Jul 09, 2025   0151 Patient is a 44-year-old male who presents to the emergency department with right throat pain.  He states that he was eating almonds at around 8 PM when shortly after this he felt a sharp pain in his right throat, which worsens each time he swallows.  No difficulty breathing, changes in voice, or difficulty swallowing his own saliva.  No abdominal pain, nausea/vomiting, chest pain.  He is in no acute distress, has reassuring vital signs.  Examination of the posterior oropharynx is normal.  No deviation of the trachea.  Plan for CT soft tissue of the neck to evaluate for presence of foreign body or acute abnormality to explain his pain.  It is also possible that he is just having residual pain from a small cut or abrasion from the nut, and is experiencing the residual pain from this.  Will provide Toradol and viscous lidocaine in the meantime.   0309 No electrolyte abnormalities or kidney injury.  No leukocytosis or anemia.   0417 CT neck negative for foreign body or acute abnormalities. Will discharge at this time. Pain likely due to superficial injury sustained from the almond that will resolve with time. Rx for lidocaine swish and spit. Return precautions provided.       Medical Decision Making  I independently interpreted the CT neck and note no acute findings, no foreign body. See  radiology report for final interpretation.  Discharge. I prescribed additional prescription strength medication(s) as charted. See documentation for any additional details.    MIPS (CTPE, Dental pain, Villagran, Sinusitis, Asthma/COPD, Head Trauma): Not Applicable    SEPSIS: None    At the conclusion of the encounter I discussed the results of all of the tests and the disposition. The questions were answered. The patient or family acknowledged understanding and was agreeable with the care plan.     0 minutes of critical care time     MEDICATIONS GIVEN IN THE EMERGENCY:  Medications   lidocaine (viscous) (XYLOCAINE) 2 % solution 10 mL (10 mLs Mouth/Throat $Given 7/9/25 0156)   ketorolac (TORADOL) injection 15 mg (15 mg Intravenous $Given 7/9/25 0206)   iopamidol (ISOVUE-370) solution 90 mL (90 mLs Intravenous $Given 7/9/25 0338)       NEW PRESCRIPTIONS STARTED AT TODAY'S ER VISIT  Current Discharge Medication List        START taking these medications    Details   lidocaine, viscous, (XYLOCAINE) 2 % solution Swish and spit 15 mLs in mouth every 3 hours as needed for pain. ; Max 8 doses/24 hour period.  Qty: 100 mL, Refills: 0                =================================================================    HPI    Patient information was obtained from: Patient    Use of : N/A        Ankit Man is a 44 year old male with a pertinent history of chronic GERD and methamphetamine dependence who presents to this ED via private car with  for evaluation of swallowed foreign body.    Patient was eating almonds at around 2000 last night and feels like there is one stuck in the back of his throat on the right side. He endorses intermittent sharp pains in his throat, especially when he swallows. He reports mild difficulty breathing while swallowing. Patient has tried drinking water, drinking soda and burping, and eating with no relief. He took benadryl in case of an allergic reaction. Patient denies rash,  vomiting, abdominal pain, and change in voice.      PAST MEDICAL HISTORY:  Past Medical History:   Diagnosis Date    Anxiety     Depressive disorder     ED (erectile dysfunction)     Substance abuse (H)        PAST SURGICAL HISTORY:  History reviewed. No pertinent surgical history.        CURRENT MEDICATIONS:    acetaminophen (TYLENOL) 500 MG tablet  ibuprofen (ADVIL/MOTRIN) 600 MG tablet  lidocaine, viscous, (XYLOCAINE) 2 % solution  magnesium hydroxide (MILK OF MAGNESIA) 400 MG/5ML suspension  omeprazole (PRILOSEC) 20 MG DR capsule  ondansetron (ZOFRAN ODT) 4 MG ODT tab  oxyCODONE (ROXICODONE) 5 MG tablet  sildenafil (VIAGRA) 100 MG tablet        ALLERGIES:  No Known Allergies    FAMILY HISTORY:  History reviewed. No pertinent family history.    SOCIAL HISTORY:   Social History     Socioeconomic History    Marital status: Single   Tobacco Use    Smoking status: Every Day     Types: Cigarettes    Smokeless tobacco: Never   Substance and Sexual Activity    Alcohol use: Yes    Drug use: Yes     Types: Marijuana, Methamphetamines    Sexual activity: Yes     Social Drivers of Health     Financial Resource Strain: Low Risk  (2/11/2025)    [facility]    Financial Resource Strain     Difficulty of Paying Living Expenses: 3   Food Insecurity: No Food Insecurity (2/11/2025)    [facility]    Food Insecurity     Do you worry your food will run out before you are able to buy more?: 1   Transportation Needs: No Transportation Needs (2/11/2025)    [facility]    Transportation Needs     Does lack of transportation keep you from medical appointments?: 1     Does lack of transportation keep you from work, meetings or getting things that you need?: 1   Social Connections: Socially Integrated (2/11/2025)    [facility]    Social Connections      "Do you often feel lonely or isolated from those around you?: 0   Housing Stability: Low Risk  (2/11/2025)    Received from [facility]    Housing Stability     What is your housing situation today?: 1       VITALS:  BP (!) 133/90   Pulse 75   Temp 98.2  F (36.8  C) (Temporal)   Resp 16   Ht 1.651 m (5' 5\")   Wt 70.5 kg (155 lb 8 oz)   SpO2 97%   BMI 25.88 kg/m      PHYSICAL EXAM    Physical Exam  Vitals and nursing note reviewed.   Constitutional:       General: He is not in acute distress.     Appearance: Normal appearance. He is normal weight. He is not ill-appearing.   HENT:      Head: Normocephalic.      Nose: Nose normal.      Mouth/Throat:      Mouth: Mucous membranes are moist.      Pharynx: Oropharynx is clear. No oropharyngeal exudate or posterior oropharyngeal erythema.      Comments: Uvula midline.  No foreign body detected.  No lacerations, abrasions, swelling, bruising.  No submandibular edema.  No asymmetry of the oropharynx.  Eyes:      Conjunctiva/sclera: Conjunctivae normal.   Cardiovascular:      Rate and Rhythm: Normal rate.      Pulses: Normal pulses.   Pulmonary:      Effort: Pulmonary effort is normal.   Abdominal:      General: Abdomen is flat.   Musculoskeletal:         General: Normal range of motion.      Cervical back: Normal range of motion and neck supple. No rigidity or tenderness.   Skin:     General: Skin is warm.   Neurological:      Mental Status: He is alert and oriented to person, place, and time. Mental status is at baseline.   Psychiatric:         Mood and Affect: Mood normal.         Behavior: Behavior normal.         Thought Content: Thought content normal.         Judgment: Judgment normal.            LAB:  All pertinent labs reviewed and interpreted.  Results for orders placed or performed during the hospital encounter of 07/09/25   Soft tissue neck CT w contrast    Impression    IMPRESSION:   1.  No discrete mass lesion or abnormal " enhancement.  2.  No evidence of airway compromise.  3.  No discrete foreign body visualized by CT.     Basic metabolic panel   Result Value Ref Range    Sodium 140 135 - 145 mmol/L    Potassium 4.5 3.4 - 5.3 mmol/L    Chloride 100 98 - 107 mmol/L    Carbon Dioxide (CO2) 30 (H) 22 - 29 mmol/L    Anion Gap 10 7 - 15 mmol/L    Urea Nitrogen 16.3 6.0 - 20.0 mg/dL    Creatinine 0.84 0.67 - 1.17 mg/dL    GFR Estimate >90 >60 mL/min/1.73m2    Calcium 10.0 8.8 - 10.4 mg/dL    Glucose 91 70 - 99 mg/dL   CBC with platelets and differential   Result Value Ref Range    WBC Count 10.6 4.0 - 11.0 10e3/uL    RBC Count 5.39 4.40 - 5.90 10e6/uL    Hemoglobin 15.7 13.3 - 17.7 g/dL    Hematocrit 46.3 40.0 - 53.0 %    MCV 86 78 - 100 fL    MCH 29.1 26.5 - 33.0 pg    MCHC 33.9 31.5 - 36.5 g/dL    RDW 13.4 10.0 - 15.0 %    Platelet Count 337 150 - 450 10e3/uL    % Neutrophils 55 %    % Lymphocytes 29 %    % Monocytes 11 %    % Eosinophils 3 %    % Basophils 1 %    % Immature Granulocytes 1 %    NRBCs per 100 WBC 0 <1 /100    Absolute Neutrophils 5.9 1.6 - 8.3 10e3/uL    Absolute Lymphocytes 3.1 0.8 - 5.3 10e3/uL    Absolute Monocytes 1.2 0.0 - 1.3 10e3/uL    Absolute Eosinophils 0.3 0.0 - 0.7 10e3/uL    Absolute Basophils 0.1 0.0 - 0.2 10e3/uL    Absolute Immature Granulocytes 0.1 <=0.4 10e3/uL    Absolute NRBCs 0.0 10e3/uL       RADIOLOGY:  Reviewed all pertinent imaging. Please see official radiology report.  Soft tissue neck CT w contrast   Final Result   IMPRESSION:    1.  No discrete mass lesion or abnormal enhancement.   2.  No evidence of airway compromise.   3.  No discrete foreign body visualized by CT.             PROCEDURES:   None      [facility] System Documentation:   CMS Diagnoses: None             Kolby LYNCH, am serving as a scribe to document services personally performed by Drew Landers MD based on my observation and the provider's statements to me. I, Drew Landers MD, attest that Kolby Larson  is acting in a scribe capacity, has observed my performance of the services and has documented them in accordance with my direction.    Drew Landers MD  [facility] EMERGENCY DEPARTMENT  [address] [place] [id number] [phone]     Drew Landers MD  07/09/25 0422

## 2025-07-09 NOTE — DISCHARGE INSTRUCTIONS
Fortunately the CT scan did not show any evidence of foreign body or retained food in the throat.  The sensation you are feeling is most likely an injury from the part of the [place] that caused an abrasion or cut to that part of the throat, and although it is not there anymore, the injury has occurred which will cause the pain you are feeling when you swallow.  This will resolve over time, and we are sending you with a topical anesthetic to swish and spit (not swallow) for continued pain, which will likely resolve over several days.    Please return to the emergency department if your symptoms worsen, if you are unable to swallow your own saliva, if you develop difficulty breathing.

## 2025-07-09 NOTE — Clinical Note
Ankit Man was seen and treated in our emergency department on 7/8/2025.  He may return to work on 07/10/2025.       If you have any questions or concerns, please don't hesitate to call.      Drew Landers MD

## 2025-07-09 NOTE — ED TRIAGE NOTES
Pt states he was eating almonds two hours ago and feels like there is a piece stuck in the back of his throat on the right side. Pt able to swallow secretions and denies difficulty breathing at this time.      Triage Assessment (Adult)       Row Name 07/08/25 0234          Triage Assessment    Airway WDL WDL        Respiratory WDL    Respiratory WDL WDL        Skin Circulation/Temperature WDL    Skin Circulation/Temperature WDL WDL        Cardiac WDL    Cardiac WDL WDL        Peripheral/Neurovascular WDL    Peripheral Neurovascular WDL WDL        Cognitive/Neuro/Behavioral WDL    Cognitive/Neuro/Behavioral WDL WDL